# Patient Record
Sex: FEMALE | Race: WHITE | NOT HISPANIC OR LATINO | Employment: FULL TIME | URBAN - METROPOLITAN AREA
[De-identification: names, ages, dates, MRNs, and addresses within clinical notes are randomized per-mention and may not be internally consistent; named-entity substitution may affect disease eponyms.]

---

## 2023-01-30 PROBLEM — R25.1 TREMOR: Status: ACTIVE | Noted: 2023-01-30

## 2023-01-30 PROBLEM — E78.00 HYPERCHOLESTEROLEMIA: Status: ACTIVE | Noted: 2023-01-30

## 2023-01-30 PROBLEM — M17.11 OSTEOARTHRITIS OF RIGHT KNEE: Status: ACTIVE | Noted: 2023-01-30

## 2023-01-30 PROBLEM — S06.0X9A CONCUSSION WITH LOSS OF CONSCIOUSNESS: Status: ACTIVE | Noted: 2023-01-30

## 2023-01-30 PROBLEM — H61.23 BILATERAL IMPACTED CERUMEN: Status: ACTIVE | Noted: 2023-01-30

## 2023-01-30 PROBLEM — J01.90 ACUTE SINUSITIS: Status: ACTIVE | Noted: 2023-01-30

## 2023-01-30 PROBLEM — I10 HYPERTENSION: Status: ACTIVE | Noted: 2023-01-30

## 2023-01-30 PROBLEM — J06.9 URTI (ACUTE UPPER RESPIRATORY INFECTION): Status: ACTIVE | Noted: 2023-01-30

## 2023-01-30 PROBLEM — F98.8 ATTENTION DEFICIT DISORDER WITHOUT HYPERACTIVITY: Status: ACTIVE | Noted: 2023-01-30

## 2023-01-30 PROBLEM — R79.89 ABNORMAL THYROID BLOOD TEST: Status: ACTIVE | Noted: 2023-01-30

## 2023-01-30 PROBLEM — S09.90XA HEAD TRAUMA: Status: ACTIVE | Noted: 2023-01-30

## 2023-01-30 PROBLEM — R05.9 COUGH: Status: ACTIVE | Noted: 2023-01-30

## 2023-01-30 PROBLEM — G44.209 TENSION HEADACHE: Status: ACTIVE | Noted: 2023-01-30

## 2023-01-30 PROBLEM — R87.612 LOW GRADE SQUAMOUS INTRAEPITHELIAL LESION (LGSIL) ON CERVICAL PAP SMEAR: Status: ACTIVE | Noted: 2023-01-30

## 2023-01-30 PROBLEM — J30.9 ALLERGIC RHINITIS: Status: ACTIVE | Noted: 2023-01-30

## 2023-01-30 PROBLEM — E55.9 VITAMIN D DEFICIENCY: Status: ACTIVE | Noted: 2023-01-30

## 2023-01-30 PROBLEM — G47.00 INSOMNIA: Status: ACTIVE | Noted: 2023-01-30

## 2023-01-30 PROBLEM — M25.561 RIGHT KNEE PAIN: Status: ACTIVE | Noted: 2023-01-30

## 2023-01-30 PROBLEM — J45.909 ASTHMA (HHS-HCC): Status: ACTIVE | Noted: 2023-01-30

## 2023-01-30 RX ORDER — LISINOPRIL 20 MG/1
1 TABLET ORAL DAILY
COMMUNITY
Start: 2022-07-15 | End: 2023-11-07 | Stop reason: SDUPTHER

## 2023-01-30 RX ORDER — TRIAMCINOLONE ACETONIDE 1 MG/G
CREAM TOPICAL
COMMUNITY
Start: 2016-12-13 | End: 2023-11-07 | Stop reason: SDUPTHER

## 2023-01-30 RX ORDER — FLUTICASONE PROPIONATE 50 MCG
2 SPRAY, SUSPENSION (ML) NASAL DAILY
COMMUNITY

## 2023-01-30 RX ORDER — CHOLECALCIFEROL (VITAMIN D3) 125 MCG
1 CAPSULE ORAL DAILY
COMMUNITY

## 2023-01-30 RX ORDER — NORGESTIMATE AND ETHINYL ESTRADIOL 0.25-0.035
1 KIT ORAL DAILY
COMMUNITY
Start: 2012-08-16 | End: 2023-03-14 | Stop reason: SDUPTHER

## 2023-03-14 ENCOUNTER — OFFICE VISIT (OUTPATIENT)
Dept: PRIMARY CARE | Facility: CLINIC | Age: 48
End: 2023-03-14
Payer: COMMERCIAL

## 2023-03-14 VITALS
HEART RATE: 80 BPM | SYSTOLIC BLOOD PRESSURE: 116 MMHG | BODY MASS INDEX: 23.54 KG/M2 | DIASTOLIC BLOOD PRESSURE: 78 MMHG | WEIGHT: 137.9 LBS | HEIGHT: 64 IN | OXYGEN SATURATION: 100 %

## 2023-03-14 DIAGNOSIS — Z30.41 ENCOUNTER FOR SURVEILLANCE OF CONTRACEPTIVE PILLS: Primary | ICD-10-CM

## 2023-03-14 DIAGNOSIS — E78.00 HYPERCHOLESTEROLEMIA: ICD-10-CM

## 2023-03-14 DIAGNOSIS — J45.40 MODERATE PERSISTENT ASTHMA WITHOUT COMPLICATION (HHS-HCC): ICD-10-CM

## 2023-03-14 DIAGNOSIS — Z12.11 COLON CANCER SCREENING: ICD-10-CM

## 2023-03-14 DIAGNOSIS — Z00.00 ANNUAL PHYSICAL EXAM: ICD-10-CM

## 2023-03-14 DIAGNOSIS — S06.0X9D CONCUSSION WITH LOSS OF CONSCIOUSNESS, SUBSEQUENT ENCOUNTER: ICD-10-CM

## 2023-03-14 DIAGNOSIS — I10 PRIMARY HYPERTENSION: ICD-10-CM

## 2023-03-14 PROBLEM — R25.1 TREMOR: Status: RESOLVED | Noted: 2023-01-30 | Resolved: 2023-03-14

## 2023-03-14 PROBLEM — H61.23 BILATERAL IMPACTED CERUMEN: Status: RESOLVED | Noted: 2023-01-30 | Resolved: 2023-03-14

## 2023-03-14 PROBLEM — S09.90XA HEAD TRAUMA: Status: RESOLVED | Noted: 2023-01-30 | Resolved: 2023-03-14

## 2023-03-14 PROBLEM — J06.9 URTI (ACUTE UPPER RESPIRATORY INFECTION): Status: RESOLVED | Noted: 2023-01-30 | Resolved: 2023-03-14

## 2023-03-14 PROBLEM — M25.561 RIGHT KNEE PAIN: Status: RESOLVED | Noted: 2023-01-30 | Resolved: 2023-03-14

## 2023-03-14 PROBLEM — R05.9 COUGH: Status: RESOLVED | Noted: 2023-01-30 | Resolved: 2023-03-14

## 2023-03-14 PROBLEM — J01.90 ACUTE SINUSITIS: Status: RESOLVED | Noted: 2023-01-30 | Resolved: 2023-03-14

## 2023-03-14 PROCEDURE — 1036F TOBACCO NON-USER: CPT | Performed by: INTERNAL MEDICINE

## 2023-03-14 PROCEDURE — 3078F DIAST BP <80 MM HG: CPT | Performed by: INTERNAL MEDICINE

## 2023-03-14 PROCEDURE — 99214 OFFICE O/P EST MOD 30 MIN: CPT | Performed by: INTERNAL MEDICINE

## 2023-03-14 PROCEDURE — 3074F SYST BP LT 130 MM HG: CPT | Performed by: INTERNAL MEDICINE

## 2023-03-14 RX ORDER — NORGESTIMATE AND ETHINYL ESTRADIOL 0.25-0.035
1 KIT ORAL DAILY
Qty: 28 TABLET | Refills: 0 | Status: SHIPPED | OUTPATIENT
Start: 2023-03-14

## 2023-03-14 RX ORDER — NAPROXEN 500 MG/1
500 TABLET ORAL
COMMUNITY
Start: 2015-10-20 | End: 2023-07-11 | Stop reason: ALTCHOICE

## 2023-03-14 RX ORDER — CYCLOSPORINE 0.5 MG/ML
1 EMULSION OPHTHALMIC 2 TIMES DAILY
COMMUNITY
Start: 2022-11-14 | End: 2023-07-11 | Stop reason: ALTCHOICE

## 2023-03-14 ASSESSMENT — ENCOUNTER SYMPTOMS
HEADACHES: 0
CHILLS: 0
LIGHT-HEADEDNESS: 0
FATIGUE: 0
DYSURIA: 0
DIFFICULTY URINATING: 0
NAUSEA: 0
FREQUENCY: 0
MYALGIAS: 0
SORE THROAT: 0
DIZZINESS: 0
SHORTNESS OF BREATH: 0
ARTHRALGIAS: 0
VOMITING: 0
PALPITATIONS: 0
WEAKNESS: 0
HEMATURIA: 0
DIARRHEA: 0
CONSTIPATION: 0
FEVER: 0
COUGH: 0

## 2023-03-14 ASSESSMENT — PATIENT HEALTH QUESTIONNAIRE - PHQ9
SUM OF ALL RESPONSES TO PHQ9 QUESTIONS 1 AND 2: 0
1. LITTLE INTEREST OR PLEASURE IN DOING THINGS: NOT AT ALL
2. FEELING DOWN, DEPRESSED OR HOPELESS: NOT AT ALL

## 2023-03-14 ASSESSMENT — PAIN SCALES - GENERAL: PAINLEVEL: 0-NO PAIN

## 2023-03-14 NOTE — ASSESSMENT & PLAN NOTE
Patient is adopted and is unaware of any family history of colon cancer or polyps.  She declined colonoscopy but does agree to complete a Cologuard test and the order will be placed.

## 2023-03-14 NOTE — PATIENT INSTRUCTIONS
Please complete cologuard test for colon cancer screening    Follow up Dr Jewell in 4 months    Get fasting labs in June before next appt.

## 2023-03-14 NOTE — ASSESSMENT & PLAN NOTE
Patient continues to have some issues with memory loss and concentration.  It has improved compared to the baseline and I recommended that she continue to push through because it should continue to slowly improve.  She was offered a neurology referral which she declined.

## 2023-03-14 NOTE — PROGRESS NOTES
Subjective   Patient ID: Rosalva Mayen is a 47 y.o. female who presents for 4 month re for HTN management.  Patient is here for routine follow-up for her medical problems including hypertension, cholesterol and medication management.  She has been having problems getting a hold of her OB/GYN to refill her birth control so I will give her 1 month to get by so she does not run out    Patient is 47 years old and has not had any colon screening yet.  She is adopted and has no known family history.  I did recommend a colonoscopy which she declines but does agree to take a Cologuard test.    Patient did have some head trauma and a concussion a few months back and continues to have issues with memory lapse and trouble concentrating.  She wanted to know if there is anything else she can do about it.  I offered her neurology referral but she declined.        Review of Systems   Constitutional:  Negative for chills, fatigue and fever.        Patient continues to complain of difficulty concentrating and finding her words since her concussion.  The memory lapses seem to be persistent.  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   HENT:  Negative for sore throat.    Eyes:  Negative for visual disturbance.   Respiratory:  Negative for cough and shortness of breath.    Cardiovascular:  Negative for chest pain, palpitations and leg swelling.   Gastrointestinal:  Negative for constipation, diarrhea, nausea and vomiting.   Genitourinary:  Negative for difficulty urinating, dysuria, frequency, hematuria and urgency.   Musculoskeletal:  Negative for arthralgias and myalgias.   Skin:  Negative for rash.   Neurological:  Negative for dizziness, syncope, weakness, light-headedness and headaches.       Objective   Physical Exam  Constitutional:       Appearance: Normal appearance.   HENT:      Head: Normocephalic and atraumatic.      Nose: Nose normal.  "  Eyes:      Extraocular Movements: Extraocular movements intact.      Pupils: Pupils are equal, round, and reactive to light.   Cardiovascular:      Rate and Rhythm: Normal rate and regular rhythm.   Pulmonary:      Breath sounds: Normal breath sounds.   Abdominal:      General: Abdomen is flat. Bowel sounds are normal.      Palpations: Abdomen is soft.   Musculoskeletal:      Right lower leg: No edema.      Left lower leg: No edema.   Neurological:      Mental Status: She is alert.     /78 (BP Location: Left arm, Patient Position: Sitting)   Pulse 80   Ht 1.613 m (5' 3.5\")   Wt 62.6 kg (137 lb 14.4 oz)   SpO2 100%   BMI 24.04 kg/m²       Assessment/Plan   Problem List Items Addressed This Visit          Nervous    Concussion with loss of consciousness     Patient continues to have some issues with memory loss and concentration.  It has improved compared to the baseline and I recommended that she continue to push through because it should continue to slowly improve.  She was offered a neurology referral which she declined.            Respiratory    Asthma     Patient denies any recent asthma exacerbations and is stable.         Relevant Orders    Lipid Panel    CBC    Comprehensive Metabolic Panel    TSH with reflex to Free T4 if abnormal    Vitamin D 1,25 Dihydroxy       Circulatory    Hypertension     Blood pressure is stable.         Relevant Orders    Lipid Panel    CBC    Comprehensive Metabolic Panel    TSH with reflex to Free T4 if abnormal    Vitamin D 1,25 Dihydroxy       Other    Hypercholesterolemia     Cholesterol labs are due in June and she was given a requisition today to have them completed before her next appointment.         Relevant Orders    Lipid Panel    CBC    Comprehensive Metabolic Panel    TSH with reflex to Free T4 if abnormal    Vitamin D 1,25 Dihydroxy    Colon cancer screening     Patient is adopted and is unaware of any family history of colon cancer or polyps.  She " declined colonoscopy but does agree to complete a Cologuard test and the order will be placed.         Relevant Orders    Cologuard® colon cancer screening     Other Visit Diagnoses       Encounter for surveillance of contraceptive pills    -  Primary    Relevant Medications    norgestimate-ethinyl estradioL (Ortho-Cyclen) 0.25-35 mg-mcg tablet    Annual physical exam        Relevant Orders    Lipid Panel    CBC    Comprehensive Metabolic Panel    TSH with reflex to Free T4 if abnormal    Vitamin D 1,25 Dihydroxy          Patient will follow-up with me in 4 months and complete annual blood work prior to that appointment.         It has been a pleasure seeing you.

## 2023-03-14 NOTE — ASSESSMENT & PLAN NOTE
Cholesterol labs are due in June and she was given a requisition today to have them completed before her next appointment.

## 2023-04-26 ENCOUNTER — TELEPHONE (OUTPATIENT)
Dept: PRIMARY CARE | Facility: CLINIC | Age: 48
End: 2023-04-26
Payer: COMMERCIAL

## 2023-04-26 LAB — NONINV COLON CA DNA+OCC BLD SCRN STL QL: POSITIVE

## 2023-04-26 NOTE — TELEPHONE ENCOUNTER
Left message on patient's voicemail to call the office to retrieve message from the Doctor.  BN   ----- Message from Arlette Jewell MD sent at 4/26/2023  5:14 PM EDT -----  Please notify patient her Cologuard test was positive this means she is at high risk for either a colon polyp or possibly even cancer.  She needs to see gastroenterologist and have a colonoscopy.  We usually refer to people at the [facility] with Dr Farrar or associate.  Would she like this referral or would she like to go someplace else?  ----- Message -----  From: [facility] [employer] [facility] - Lab Results In  Sent: 4/26/2023  11:40 AM EDT  To: Arlette Jewell MD

## 2023-04-27 DIAGNOSIS — R19.5 POSITIVE COLORECTAL CANCER SCREENING USING COLOGUARD TEST: Primary | ICD-10-CM

## 2023-04-27 NOTE — RESULT ENCOUNTER NOTE
Referral placed please help patient set up appointment and fax a copy of these results to Dr Farrar.

## 2023-05-10 ENCOUNTER — OFFICE (OUTPATIENT)
Dept: URBAN - METROPOLITAN AREA CLINIC 27 | Facility: CLINIC | Age: 48
End: 2023-05-10
Payer: COMMERCIAL

## 2023-05-10 VITALS
HEIGHT: 64 IN | WEIGHT: 137 LBS | TEMPERATURE: 97.9 F | SYSTOLIC BLOOD PRESSURE: 125 MMHG | DIASTOLIC BLOOD PRESSURE: 82 MMHG | HEART RATE: 80 BPM

## 2023-05-10 DIAGNOSIS — R85.89 OTHER ABNORMAL FINDINGS IN SPECIMENS FROM DIGESTIVE ORGANS A: ICD-10-CM

## 2023-05-10 DIAGNOSIS — K59.09 OTHER CONSTIPATION: ICD-10-CM

## 2023-05-10 PROCEDURE — 99203 OFFICE O/P NEW LOW 30 MIN: CPT | Performed by: INTERNAL MEDICINE

## 2023-06-20 LAB
ALANINE AMINOTRANSFERASE (SGPT) (U/L) IN SER/PLAS: 10 U/L (ref 7–45)
ALBUMIN (G/DL) IN SER/PLAS: 4.4 G/DL (ref 3.4–5)
ALKALINE PHOSPHATASE (U/L) IN SER/PLAS: 60 U/L (ref 33–110)
ANION GAP IN SER/PLAS: 12 MMOL/L (ref 10–20)
ASPARTATE AMINOTRANSFERASE (SGOT) (U/L) IN SER/PLAS: 13 U/L (ref 9–39)
BILIRUBIN TOTAL (MG/DL) IN SER/PLAS: 0.5 MG/DL (ref 0–1.2)
CALCIUM (MG/DL) IN SER/PLAS: 10 MG/DL (ref 8.6–10.6)
CARBON DIOXIDE, TOTAL (MMOL/L) IN SER/PLAS: 29 MMOL/L (ref 21–32)
CHLORIDE (MMOL/L) IN SER/PLAS: 100 MMOL/L (ref 98–107)
CHOLESTEROL (MG/DL) IN SER/PLAS: 259 MG/DL (ref 0–199)
CHOLESTEROL IN HDL (MG/DL) IN SER/PLAS: 91.3 MG/DL
CHOLESTEROL/HDL RATIO: 2.8
CREATININE (MG/DL) IN SER/PLAS: 0.86 MG/DL (ref 0.5–1.05)
ERYTHROCYTE DISTRIBUTION WIDTH (RATIO) BY AUTOMATED COUNT: 12.3 % (ref 11.5–14.5)
ERYTHROCYTE MEAN CORPUSCULAR HEMOGLOBIN CONCENTRATION (G/DL) BY AUTOMATED: 32.5 G/DL (ref 32–36)
ERYTHROCYTE MEAN CORPUSCULAR VOLUME (FL) BY AUTOMATED COUNT: 89 FL (ref 80–100)
ERYTHROCYTES (10*6/UL) IN BLOOD BY AUTOMATED COUNT: 4.83 X10E12/L (ref 4–5.2)
GFR FEMALE: 83 ML/MIN/1.73M2
GLUCOSE (MG/DL) IN SER/PLAS: 84 MG/DL (ref 74–99)
HEMATOCRIT (%) IN BLOOD BY AUTOMATED COUNT: 42.8 % (ref 36–46)
HEMOGLOBIN (G/DL) IN BLOOD: 13.9 G/DL (ref 12–16)
LDL: 142 MG/DL (ref 0–99)
LEUKOCYTES (10*3/UL) IN BLOOD BY AUTOMATED COUNT: 6.8 X10E9/L (ref 4.4–11.3)
NRBC (PER 100 WBCS) BY AUTOMATED COUNT: 0 /100 WBC (ref 0–0)
PLATELETS (10*3/UL) IN BLOOD AUTOMATED COUNT: 433 X10E9/L (ref 150–450)
POTASSIUM (MMOL/L) IN SER/PLAS: 4.6 MMOL/L (ref 3.5–5.3)
PROTEIN TOTAL: 7.6 G/DL (ref 6.4–8.2)
SODIUM (MMOL/L) IN SER/PLAS: 136 MMOL/L (ref 136–145)
THYROTROPIN (MIU/L) IN SER/PLAS BY DETECTION LIMIT <= 0.05 MIU/L: 2.28 MIU/L (ref 0.44–3.98)
TRIGLYCERIDE (MG/DL) IN SER/PLAS: 130 MG/DL (ref 0–149)
UREA NITROGEN (MG/DL) IN SER/PLAS: 13 MG/DL (ref 6–23)
VLDL: 26 MG/DL (ref 0–40)

## 2023-06-23 LAB — VITAMIN D 1,25-DIHYDROXY: 52.3 PG/ML (ref 19.9–79.3)

## 2023-06-27 VITALS
OXYGEN SATURATION: 99 % | SYSTOLIC BLOOD PRESSURE: 119 MMHG | DIASTOLIC BLOOD PRESSURE: 75 MMHG | RESPIRATION RATE: 12 BRPM | OXYGEN SATURATION: 99 % | SYSTOLIC BLOOD PRESSURE: 107 MMHG | WEIGHT: 128 LBS | RESPIRATION RATE: 20 BRPM | DIASTOLIC BLOOD PRESSURE: 74 MMHG | DIASTOLIC BLOOD PRESSURE: 85 MMHG | SYSTOLIC BLOOD PRESSURE: 121 MMHG | RESPIRATION RATE: 12 BRPM | HEART RATE: 96 BPM | SYSTOLIC BLOOD PRESSURE: 112 MMHG | DIASTOLIC BLOOD PRESSURE: 65 MMHG | DIASTOLIC BLOOD PRESSURE: 75 MMHG | HEART RATE: 100 BPM | RESPIRATION RATE: 19 BRPM | SYSTOLIC BLOOD PRESSURE: 122 MMHG | DIASTOLIC BLOOD PRESSURE: 71 MMHG | RESPIRATION RATE: 15 BRPM | SYSTOLIC BLOOD PRESSURE: 126 MMHG | DIASTOLIC BLOOD PRESSURE: 69 MMHG | HEART RATE: 92 BPM | RESPIRATION RATE: 19 BRPM | DIASTOLIC BLOOD PRESSURE: 77 MMHG | RESPIRATION RATE: 15 BRPM | OXYGEN SATURATION: 100 % | SYSTOLIC BLOOD PRESSURE: 112 MMHG | DIASTOLIC BLOOD PRESSURE: 74 MMHG | SYSTOLIC BLOOD PRESSURE: 115 MMHG | DIASTOLIC BLOOD PRESSURE: 67 MMHG | RESPIRATION RATE: 16 BRPM | SYSTOLIC BLOOD PRESSURE: 107 MMHG | DIASTOLIC BLOOD PRESSURE: 67 MMHG | HEART RATE: 114 BPM | SYSTOLIC BLOOD PRESSURE: 111 MMHG | RESPIRATION RATE: 19 BRPM | HEART RATE: 100 BPM | DIASTOLIC BLOOD PRESSURE: 67 MMHG | TEMPERATURE: 98.6 F | SYSTOLIC BLOOD PRESSURE: 107 MMHG | HEART RATE: 83 BPM | HEART RATE: 100 BPM | DIASTOLIC BLOOD PRESSURE: 77 MMHG | HEART RATE: 105 BPM | HEART RATE: 96 BPM | HEART RATE: 83 BPM | RESPIRATION RATE: 9 BRPM | HEART RATE: 83 BPM | OXYGEN SATURATION: 90 % | SYSTOLIC BLOOD PRESSURE: 119 MMHG | SYSTOLIC BLOOD PRESSURE: 112 MMHG | TEMPERATURE: 98.6 F | HEART RATE: 105 BPM | HEART RATE: 94 BPM | DIASTOLIC BLOOD PRESSURE: 74 MMHG | DIASTOLIC BLOOD PRESSURE: 85 MMHG | DIASTOLIC BLOOD PRESSURE: 65 MMHG | SYSTOLIC BLOOD PRESSURE: 115 MMHG | DIASTOLIC BLOOD PRESSURE: 81 MMHG | SYSTOLIC BLOOD PRESSURE: 115 MMHG | HEART RATE: 94 BPM | RESPIRATION RATE: 15 BRPM | OXYGEN SATURATION: 100 % | WEIGHT: 128 LBS | OXYGEN SATURATION: 100 % | HEART RATE: 92 BPM | RESPIRATION RATE: 14 BRPM | RESPIRATION RATE: 11 BRPM | HEART RATE: 114 BPM | RESPIRATION RATE: 11 BRPM | DIASTOLIC BLOOD PRESSURE: 71 MMHG | SYSTOLIC BLOOD PRESSURE: 121 MMHG | SYSTOLIC BLOOD PRESSURE: 95 MMHG | RESPIRATION RATE: 9 BRPM | OXYGEN SATURATION: 90 % | DIASTOLIC BLOOD PRESSURE: 55 MMHG | RESPIRATION RATE: 9 BRPM | HEART RATE: 94 BPM | RESPIRATION RATE: 13 BRPM | RESPIRATION RATE: 12 BRPM | DIASTOLIC BLOOD PRESSURE: 71 MMHG | RESPIRATION RATE: 20 BRPM | HEART RATE: 99 BPM | TEMPERATURE: 98.6 F | OXYGEN SATURATION: 99 % | SYSTOLIC BLOOD PRESSURE: 95 MMHG | DIASTOLIC BLOOD PRESSURE: 77 MMHG | DIASTOLIC BLOOD PRESSURE: 81 MMHG | HEIGHT: 64 IN | DIASTOLIC BLOOD PRESSURE: 65 MMHG | RESPIRATION RATE: 14 BRPM | DIASTOLIC BLOOD PRESSURE: 75 MMHG | RESPIRATION RATE: 16 BRPM | HEART RATE: 114 BPM | SYSTOLIC BLOOD PRESSURE: 111 MMHG | SYSTOLIC BLOOD PRESSURE: 122 MMHG | HEART RATE: 96 BPM | SYSTOLIC BLOOD PRESSURE: 122 MMHG | SYSTOLIC BLOOD PRESSURE: 121 MMHG | SYSTOLIC BLOOD PRESSURE: 95 MMHG | DIASTOLIC BLOOD PRESSURE: 85 MMHG | RESPIRATION RATE: 16 BRPM | RESPIRATION RATE: 13 BRPM | SYSTOLIC BLOOD PRESSURE: 126 MMHG | HEART RATE: 105 BPM | WEIGHT: 128 LBS | DIASTOLIC BLOOD PRESSURE: 55 MMHG | DIASTOLIC BLOOD PRESSURE: 69 MMHG | RESPIRATION RATE: 20 BRPM | RESPIRATION RATE: 11 BRPM | DIASTOLIC BLOOD PRESSURE: 55 MMHG | DIASTOLIC BLOOD PRESSURE: 81 MMHG | SYSTOLIC BLOOD PRESSURE: 119 MMHG | RESPIRATION RATE: 13 BRPM | DIASTOLIC BLOOD PRESSURE: 69 MMHG | SYSTOLIC BLOOD PRESSURE: 126 MMHG | SYSTOLIC BLOOD PRESSURE: 111 MMHG | HEIGHT: 64 IN | HEART RATE: 99 BPM | HEART RATE: 92 BPM | HEART RATE: 99 BPM | RESPIRATION RATE: 14 BRPM | OXYGEN SATURATION: 90 % | HEIGHT: 64 IN

## 2023-06-29 ENCOUNTER — AMBULATORY SURGICAL CENTER (OUTPATIENT)
Dept: URBAN - METROPOLITAN AREA SURGERY 12 | Facility: SURGERY | Age: 48
End: 2023-06-29

## 2023-06-29 ENCOUNTER — AMBULATORY SURGICAL CENTER (OUTPATIENT)
Dept: URBAN - METROPOLITAN AREA SURGERY 12 | Facility: SURGERY | Age: 48
End: 2023-06-29
Payer: COMMERCIAL

## 2023-06-29 ENCOUNTER — TELEPHONE (OUTPATIENT)
Dept: PRIMARY CARE | Facility: CLINIC | Age: 48
End: 2023-06-29
Payer: COMMERCIAL

## 2023-06-29 DIAGNOSIS — K59.09 OTHER CONSTIPATION: ICD-10-CM

## 2023-06-29 DIAGNOSIS — R19.5 OTHER FECAL ABNORMALITIES: ICD-10-CM

## 2023-06-29 PROBLEM — K57.30 DIVERTICULOSIS OF LARGE INTESTINE WITHOUT PERFORATION OR ABS: Status: ACTIVE | Noted: 2023-06-29

## 2023-06-29 PROCEDURE — 45378 DIAGNOSTIC COLONOSCOPY: CPT | Performed by: INTERNAL MEDICINE

## 2023-06-29 NOTE — TELEPHONE ENCOUNTER
Patient notified and read message. Patient also state to let you know she had her colonoscopy done.  BN      ----- Message from Arlette Jewell MD sent at 6/27/2023  8:45 PM EDT -----  Please notify patient that all of her labs were normal except for her cholesterol.  Her LDL or bad cholesterol should be less than 100 and it was 142.  Please work on a low-fat diet and I will see her at her appointment in July.

## 2023-07-11 ENCOUNTER — OFFICE VISIT (OUTPATIENT)
Dept: PRIMARY CARE | Facility: CLINIC | Age: 48
End: 2023-07-11
Payer: COMMERCIAL

## 2023-07-11 VITALS
SYSTOLIC BLOOD PRESSURE: 113 MMHG | DIASTOLIC BLOOD PRESSURE: 77 MMHG | OXYGEN SATURATION: 100 % | HEIGHT: 64 IN | BODY MASS INDEX: 23.73 KG/M2 | HEART RATE: 72 BPM | WEIGHT: 139 LBS

## 2023-07-11 DIAGNOSIS — E78.00 HYPERCHOLESTEROLEMIA: ICD-10-CM

## 2023-07-11 DIAGNOSIS — I10 PRIMARY HYPERTENSION: ICD-10-CM

## 2023-07-11 DIAGNOSIS — Z12.31 SCREENING MAMMOGRAM, ENCOUNTER FOR: Primary | ICD-10-CM

## 2023-07-11 DIAGNOSIS — J45.20 MILD INTERMITTENT ASTHMA, UNSPECIFIED WHETHER COMPLICATED (HHS-HCC): ICD-10-CM

## 2023-07-11 PROBLEM — R79.89 ABNORMAL THYROID BLOOD TEST: Status: RESOLVED | Noted: 2023-01-30 | Resolved: 2023-07-11

## 2023-07-11 PROCEDURE — 1036F TOBACCO NON-USER: CPT | Performed by: INTERNAL MEDICINE

## 2023-07-11 PROCEDURE — 3078F DIAST BP <80 MM HG: CPT | Performed by: INTERNAL MEDICINE

## 2023-07-11 PROCEDURE — 3074F SYST BP LT 130 MM HG: CPT | Performed by: INTERNAL MEDICINE

## 2023-07-11 PROCEDURE — 99214 OFFICE O/P EST MOD 30 MIN: CPT | Performed by: INTERNAL MEDICINE

## 2023-07-11 RX ORDER — IBUPROFEN 200 MG
200 TABLET ORAL EVERY 6 HOURS PRN
COMMUNITY

## 2023-07-11 ASSESSMENT — ENCOUNTER SYMPTOMS
LIGHT-HEADEDNESS: 0
FREQUENCY: 0
DYSURIA: 0
HEADACHES: 0
COUGH: 0
MYALGIAS: 0
HEMATURIA: 0
CHILLS: 0
FATIGUE: 0
PALPITATIONS: 0
WEAKNESS: 0
CONSTIPATION: 0
DIZZINESS: 0
SORE THROAT: 0
NAUSEA: 0
FEVER: 0
ARTHRALGIAS: 0
DIARRHEA: 0
DIFFICULTY URINATING: 0
SHORTNESS OF BREATH: 0
VOMITING: 0

## 2023-07-11 ASSESSMENT — PATIENT HEALTH QUESTIONNAIRE - PHQ9
1. LITTLE INTEREST OR PLEASURE IN DOING THINGS: NOT AT ALL
SUM OF ALL RESPONSES TO PHQ9 QUESTIONS 1 AND 2: 0
2. FEELING DOWN, DEPRESSED OR HOPELESS: NOT AT ALL

## 2023-07-11 ASSESSMENT — PAIN SCALES - GENERAL: PAINLEVEL: 2

## 2023-07-11 NOTE — PATIENT INSTRUCTIONS
Please get mammo    Work on low fat diet    Follow up Dr Jewell in 4 months  for HTN      Ways to Help Prevent Falls at Home    Quick Tips   ? Ask for help if you need it. Most people want to help!   ? Get up slowly after sitting or laying down   ? Wear a medical alert device or keep cell phone in your pocket   ? Use night lights, especially areas near a bathroom   ? Keep the items you use often within reach on a small stool or end table   ? Use an assistive device such as walker or cane, as directed by provider/physical therapy   ? Use a non-slip mat and grab bars in your bathroom. Look for home health sections for best options     Other Areas to Focus On   ? Exercise and nutrition: Regular exercise or taking a falls prevention class are great ways improve strength and balance. Don’t forget to stay hydrated and bring a snack!   ? Medicine side effects: Some medicines can make you sleepy or dizzy, which could cause a fall. Ask your healthcare provider about the side effects your medicines could cause. Be sure to let them know if you take any vitamins or supplements as well.   ? Tripping hazards: Remove items you could trip on, such as loose mats, rugs, cords, and clutter. Wear closed toe shoes with rubber soles.   ? Health and wellness: Get regular checkups with your healthcare provider, plus routine vision and hearing screenings. Talk with your healthcare provider about:   o Your medicines and the possible side effects - bring them in a bag if that is easier!   o Problems with balance or feeling dizzy   o Ways to promote bone health, such as Vitamin D and calcium supplements   o Questions or concerns about falling     *Ask your healthcare team if you have questions     ©[facility], 2022

## 2023-07-11 NOTE — PROGRESS NOTES
Subjective   Patient ID: Rosalva Mayen is a 47 y.o. female who presents for 4 month follow-up for HTN management.  BN    Patient is here today for routine 4-month follow-up for hypertension.  She is on birth control as well.  Blood pressure is stable without difficulties at this time.  Patient recently had a steroid injection in her right knee and is now able to run again and is feeling much better.        Review of Systems   Constitutional:  Negative for chills, fatigue and fever.   HENT:  Negative for sore throat.    Eyes:  Negative for visual disturbance.   Respiratory:  Negative for cough and shortness of breath.    Cardiovascular:  Negative for chest pain, palpitations and leg swelling.   Gastrointestinal:  Negative for constipation, diarrhea, nausea and vomiting.   Genitourinary:  Negative for difficulty urinating, dysuria, frequency, hematuria and urgency.   Musculoskeletal:  Negative for arthralgias and myalgias.   Skin:  Negative for rash.   Neurological:  Negative for dizziness, syncope, weakness, light-headedness and headaches.       Objective   Medication Documentation Review Audit       Reviewed by Arlette Jewell MD (Physician) on 03/14/23 at 0933      Medication Order Taking? Sig Documenting Provider Last Dose Status   cholecalciferol (Vitamin D-3) 125 MCG (5000 UT) capsule [id number] Yes Take 1 capsule (125 mcg) by mouth once daily. Historical Provider, MD Taking Active   fluticasone (Flonase) 50 mcg/actuation nasal spray [id number] Yes Administer 2 sprays into each nostril once daily. Historical Provider, MD Taking Active   lisinopril 20 mg tablet [id number] Yes Take 1 tablet (20 mg) by mouth once daily. Historical Provider, MD Taking Active   naproxen (Naprosyn) 500 mg tablet [id number] No Take 1 tablet (500 mg) by mouth. Historical Provider, MD Not Taking Active   norgestimate-ethinyl estradioL (Ortho-Cyclen) 0.25-35 mg-mcg tablet [id number]  Take 1 tablet by mouth once daily. As directed Arlette Jewell MD   "Active   Restasis 0.05 % ophthalmic emulsion [id number] Yes Administer 1 drop into both eyes in the morning and 1 drop before bedtime. Historical Provider, MD Taking Active   triamcinolone (Kenalog) 0.1 % cream [id number] Yes APPLY A THIN LAYER TO AFFECTED AREA(S) TWICE DAILY. Historical Provider, MD Taking Active                  Physical Exam  Constitutional:       Appearance: Normal appearance.   HENT:      Head: Normocephalic and atraumatic.      Nose: Nose normal.   Eyes:      Extraocular Movements: Extraocular movements intact.      Pupils: Pupils are equal, round, and reactive to light.   Cardiovascular:      Rate and Rhythm: Normal rate and regular rhythm.   Pulmonary:      Breath sounds: Normal breath sounds.   Abdominal:      General: Abdomen is flat. Bowel sounds are normal.      Palpations: Abdomen is soft.   Musculoskeletal:      Right lower leg: No edema.      Left lower leg: No edema.   Neurological:      Mental Status: She is alert.     /77 (BP Location: Left arm, Patient Position: Sitting)   Pulse 72   Ht 1.613 m (5' 3.5\")   Wt 63 kg (139 lb)   SpO2 100%   BMI 24.24 kg/m²       Assessment/Plan   Problem List Items Addressed This Visit       Asthma     Patient does have asthma however she has had no recent exacerbations and appears to be stable and well-controlled         Hypercholesterolemia     Patient recently got labs and LDL cholesterol is again elevated.  We discussed a low-fat diet and she will continue to work on this.  Recently she had a steroid injection in her knee and she is able to exercise again so she is hoping that will help as well.         Hypertension     Pressure is stable and well-controlled so no changes will be made and she will stay on lisinopril 20 mg daily         Screening mammogram, encounter for - Primary    Relevant Orders    BI mammo bilateral screening tomosynthesis       Labs completed and reviewed with the patient today.  Follow-up with me will be in 4 months " for hypertension         It has been a pleasure seeing you.

## 2023-10-12 ENCOUNTER — HOSPITAL ENCOUNTER (OUTPATIENT)
Dept: RADIOLOGY | Facility: CLINIC | Age: 48
Discharge: HOME | End: 2023-10-12
Payer: COMMERCIAL

## 2023-10-12 DIAGNOSIS — Z12.31 ENCOUNTER FOR SCREENING MAMMOGRAM FOR MALIGNANT NEOPLASM OF BREAST: ICD-10-CM

## 2023-10-12 PROCEDURE — 77067 SCR MAMMO BI INCL CAD: CPT | Mod: 50

## 2023-10-12 PROCEDURE — 77063 BREAST TOMOSYNTHESIS BI: CPT | Mod: BILATERAL PROCEDURE | Performed by: RADIOLOGY

## 2023-10-12 PROCEDURE — 77067 SCR MAMMO BI INCL CAD: CPT | Mod: BILATERAL PROCEDURE | Performed by: RADIOLOGY

## 2023-11-07 ENCOUNTER — OFFICE VISIT (OUTPATIENT)
Dept: ORTHOPEDIC SURGERY | Facility: CLINIC | Age: 48
End: 2023-11-07
Payer: COMMERCIAL

## 2023-11-07 ENCOUNTER — OFFICE VISIT (OUTPATIENT)
Dept: PRIMARY CARE | Facility: CLINIC | Age: 48
End: 2023-11-07
Payer: COMMERCIAL

## 2023-11-07 VITALS
OXYGEN SATURATION: 100 % | WEIGHT: 138.8 LBS | DIASTOLIC BLOOD PRESSURE: 78 MMHG | HEART RATE: 81 BPM | BODY MASS INDEX: 23.7 KG/M2 | SYSTOLIC BLOOD PRESSURE: 114 MMHG | HEIGHT: 64 IN

## 2023-11-07 VITALS — BODY MASS INDEX: 23.71 KG/M2 | WEIGHT: 138.89 LBS | HEIGHT: 64 IN

## 2023-11-07 DIAGNOSIS — E78.00 HYPERCHOLESTEROLEMIA: ICD-10-CM

## 2023-11-07 DIAGNOSIS — M17.11 PRIMARY OSTEOARTHRITIS OF RIGHT KNEE: Primary | ICD-10-CM

## 2023-11-07 DIAGNOSIS — I10 PRIMARY HYPERTENSION: Primary | ICD-10-CM

## 2023-11-07 DIAGNOSIS — L20.9 ATOPIC DERMATITIS, UNSPECIFIED TYPE: ICD-10-CM

## 2023-11-07 PROCEDURE — 1036F TOBACCO NON-USER: CPT | Performed by: INTERNAL MEDICINE

## 2023-11-07 PROCEDURE — 3074F SYST BP LT 130 MM HG: CPT | Performed by: ORTHOPAEDIC SURGERY

## 2023-11-07 PROCEDURE — 3074F SYST BP LT 130 MM HG: CPT | Performed by: INTERNAL MEDICINE

## 2023-11-07 PROCEDURE — 99213 OFFICE O/P EST LOW 20 MIN: CPT | Performed by: ORTHOPAEDIC SURGERY

## 2023-11-07 PROCEDURE — 3078F DIAST BP <80 MM HG: CPT | Performed by: INTERNAL MEDICINE

## 2023-11-07 PROCEDURE — 1036F TOBACCO NON-USER: CPT | Performed by: ORTHOPAEDIC SURGERY

## 2023-11-07 PROCEDURE — 20610 DRAIN/INJ JOINT/BURSA W/O US: CPT | Performed by: ORTHOPAEDIC SURGERY

## 2023-11-07 PROCEDURE — 3078F DIAST BP <80 MM HG: CPT | Performed by: ORTHOPAEDIC SURGERY

## 2023-11-07 PROCEDURE — 99213 OFFICE O/P EST LOW 20 MIN: CPT | Performed by: INTERNAL MEDICINE

## 2023-11-07 RX ORDER — LISINOPRIL 20 MG/1
20 TABLET ORAL DAILY
Qty: 30 TABLET | Refills: 11 | Status: SHIPPED | OUTPATIENT
Start: 2023-11-07 | End: 2024-03-07 | Stop reason: SDUPTHER

## 2023-11-07 RX ORDER — TRIAMCINOLONE ACETONIDE 40 MG/ML
2.5 INJECTION, SUSPENSION INTRA-ARTICULAR; INTRAMUSCULAR
Status: COMPLETED | OUTPATIENT
Start: 2023-11-07 | End: 2023-11-07

## 2023-11-07 RX ORDER — TRIAMCINOLONE ACETONIDE 1 MG/G
CREAM TOPICAL 2 TIMES DAILY
Qty: 45 G | Refills: 1 | Status: SHIPPED | OUTPATIENT
Start: 2023-11-07

## 2023-11-07 RX ORDER — METHYLPREDNISOLONE ACETATE 40 MG/ML
40 INJECTION, SUSPENSION INTRA-ARTICULAR; INTRALESIONAL; INTRAMUSCULAR; SOFT TISSUE
Status: COMPLETED | OUTPATIENT
Start: 2023-11-07 | End: 2023-11-07

## 2023-11-07 RX ORDER — LIDOCAINE HYDROCHLORIDE 10 MG/ML
4 INJECTION, SOLUTION EPIDURAL; INFILTRATION; INTRACAUDAL; PERINEURAL
Status: COMPLETED | OUTPATIENT
Start: 2023-11-07 | End: 2023-11-07

## 2023-11-07 RX ADMIN — LIDOCAINE HYDROCHLORIDE 4 ML: 10 INJECTION, SOLUTION EPIDURAL; INFILTRATION; INTRACAUDAL; PERINEURAL at 09:25

## 2023-11-07 RX ADMIN — TRIAMCINOLONE ACETONIDE 2.5 MG: 40 INJECTION, SUSPENSION INTRA-ARTICULAR; INTRAMUSCULAR at 09:25

## 2023-11-07 RX ADMIN — METHYLPREDNISOLONE ACETATE 40 MG: 40 INJECTION, SUSPENSION INTRA-ARTICULAR; INTRALESIONAL; INTRAMUSCULAR; SOFT TISSUE at 09:25

## 2023-11-07 ASSESSMENT — ENCOUNTER SYMPTOMS
VOMITING: 0
NAUSEA: 0
COUGH: 0
HEADACHES: 0
SHORTNESS OF BREATH: 0
MYALGIAS: 0
FEVER: 0
DIFFICULTY URINATING: 0
SORE THROAT: 0
PALPITATIONS: 0
HEMATURIA: 0
FATIGUE: 0
FREQUENCY: 0
CHILLS: 0
DIARRHEA: 0
LIGHT-HEADEDNESS: 0
WEAKNESS: 0
DYSURIA: 0
ARTHRALGIAS: 0
DIZZINESS: 0
CONSTIPATION: 0

## 2023-11-07 ASSESSMENT — PAIN SCALES - GENERAL: PAINLEVEL: 4

## 2023-11-07 NOTE — PROGRESS NOTES
Subjective   Patient ID: Rosalva Mayen is a 48 y.o. female who presents for 4 month follow up for cholesterol and HTN management.  BN    Patient is here for routine 4-month follow-up for her hypertension, high cholesterol and arthritis in her knees.  She feels well overall and has no new complaints.          Review of Systems   Constitutional:  Negative for chills, fatigue and fever.   HENT:  Negative for sore throat.    Eyes:  Negative for visual disturbance.   Respiratory:  Negative for cough and shortness of breath.    Cardiovascular:  Negative for chest pain, palpitations and leg swelling.   Gastrointestinal:  Negative for constipation, diarrhea, nausea and vomiting.   Genitourinary:  Negative for difficulty urinating, dysuria, frequency, hematuria and urgency.   Musculoskeletal:  Negative for arthralgias and myalgias.   Skin:  Negative for rash.   Neurological:  Negative for dizziness, syncope, weakness, light-headedness and headaches.       Objective   Medication Documentation Review Audit       Reviewed by Nicolas Lewis MA (Medical Assistant) on 11/07/23 at 0859      Medication Order Taking? Sig Documenting Provider Last Dose Status   cholecalciferol (Vitamin D-3) 125 MCG (5000 UT) capsule [id number] No Take 1 capsule (125 mcg) by mouth once daily. Historical Provider, MD Taking Active   fluticasone (Flonase) 50 mcg/actuation nasal spray [id number] No Administer 2 sprays into each nostril once daily. Historical Provider, MD Taking Active   ibuprofen 200 mg tablet [id number]  Take 1 tablet (200 mg) by mouth every 6 hours if needed for moderate pain (4 - 6). Historical Provider, MD  Active   Discontinued 11/07/23 0836   lisinopril 20 mg tablet [id number]  Take 1 tablet (20 mg) by mouth once daily. Arlette Jewell MD  Active   norgestimate-ethinyl estradioL (Ortho-Cyclen) 0.25-35 mg-mcg tablet [id number]  Take 1 tablet by mouth once daily. As directed Arlette Jewell MD  Active   Discontinued 11/07/23 0836  "  triamcinolone (Kenalog) 0.1 % cream [id number]  Apply topically 2 times a day. APPLY A THIN LAYER TO AFFECTED AREA(S) TWICE DAILY. Arlette Jewell MD  Active                  Allergies   Allergen Reactions    Nut - Unspecified Anaphylaxis    Tree Nuts Anaphylaxis    Clarithromycin Nausea Only    Codeine Unknown    Erythromycin Nausea Only and Other     Vomiting      Fruit Flavor Angioedema     Mangos      Other Unknown    Winston Hives    White Petrolatum-Mineral Oil Itching and Swelling    Latex Rash    Nickel Rash    Penicillins Rash    Pollen Extracts Rash     Cats, molds,dust mites, trees, grasses, weeds, ragweed     Physical Exam  Constitutional:       Appearance: Normal appearance.   HENT:      Head: Normocephalic and atraumatic.      Nose: Nose normal.   Eyes:      Extraocular Movements: Extraocular movements intact.      Pupils: Pupils are equal, round, and reactive to light.   Cardiovascular:      Rate and Rhythm: Normal rate and regular rhythm.   Pulmonary:      Breath sounds: Normal breath sounds.   Abdominal:      General: Abdomen is flat. Bowel sounds are normal.      Palpations: Abdomen is soft.   Musculoskeletal:      Right lower leg: No edema.      Left lower leg: No edema.   Neurological:      Mental Status: She is alert.     /78 (BP Location: Left arm, Patient Position: Sitting)   Pulse 81   Ht 1.613 m (5' 3.5\")   Wt 63 kg (138 lb 12.8 oz)   SpO2 100%   BMI 24.20 kg/m²       Assessment/Plan   Problem List Items Addressed This Visit       Hypercholesterolemia     Patient has hypercholesterolemia which is diet controlled.  Annual blood work is due in June         Hypertension - Primary     Blood pressure stable and well-controlled         Relevant Medications    lisinopril 20 mg tablet     Other Visit Diagnoses       Atopic dermatitis, unspecified type        Relevant Medications    triamcinolone (Kenalog) 0.1 % cream                   It has been a pleasure seeing you.  Arlette Jewell MD "

## 2023-11-07 NOTE — PROGRESS NOTES
Patient returns for an injection past medical history is unchanged she has known osteoarthritis of the right knee.    Their limb is warm, and well-perfused.  They have intact sensation to light touch in all lower extremity dermatomes.  Their quadriceps and hamstring strength is 5 of 5.  Their skin is intact.  Their limb is stable from a ligament standpoint  There is a trace effusion.  There is one out of 3 patellofemoral crepitus    Range of motion is preserved    Patient ID: Rosalva Mayen is a 48 y.o. female.    L Inj/Asp: R knee on 11/7/2023 9:25 AM  Indications: pain  Details: 22 G needle, anterior approach  Medications: 2.5 mg triamcinolone acetonide 40 mg/mL; 40 mg methylPREDNISolone acetate 40 mg/mL; 4 mL lidocaine PF 10 mg/mL (1 %)    Under sterile conditions the right knee was injected with 4cc of lidocaine 40mg DepoMedrol.  The patient tolerated the procedure well.  There were no apparent complications.  Sterile bandage was applied.  Procedure, treatment alternatives, risks and benefits explained, specific risks discussed. Consent was given by the patient. Immediately prior to procedure a time out was called to verify the correct patient, procedure, equipment, support staff and site/side marked as required. Patient was prepped and draped in the usual sterile fashion.       Successful injection for osteoarthritis of the knee.  Follow-up as needed.

## 2023-11-20 ENCOUNTER — TELEPHONE (OUTPATIENT)
Dept: PRIMARY CARE | Facility: CLINIC | Age: 48
End: 2023-11-20
Payer: COMMERCIAL

## 2023-11-20 NOTE — TELEPHONE ENCOUNTER
Patient is having an allergic reaction on arm from possible bandaid,tape. Will not go away. Asking to be seen can I put her in this afternoon at 15 min. Opening.  [phone]

## 2024-03-07 ENCOUNTER — OFFICE VISIT (OUTPATIENT)
Dept: PRIMARY CARE | Facility: CLINIC | Age: 49
End: 2024-03-07
Payer: COMMERCIAL

## 2024-03-07 VITALS
BODY MASS INDEX: 23.87 KG/M2 | HEART RATE: 76 BPM | SYSTOLIC BLOOD PRESSURE: 112 MMHG | OXYGEN SATURATION: 99 % | WEIGHT: 139.8 LBS | HEIGHT: 64 IN | DIASTOLIC BLOOD PRESSURE: 64 MMHG

## 2024-03-07 DIAGNOSIS — Z00.00 ANNUAL PHYSICAL EXAM: Primary | ICD-10-CM

## 2024-03-07 DIAGNOSIS — J45.20 MILD INTERMITTENT ASTHMA, UNSPECIFIED WHETHER COMPLICATED (HHS-HCC): ICD-10-CM

## 2024-03-07 DIAGNOSIS — E78.00 HYPERCHOLESTEROLEMIA: ICD-10-CM

## 2024-03-07 DIAGNOSIS — I10 PRIMARY HYPERTENSION: ICD-10-CM

## 2024-03-07 PROBLEM — S06.0X9A CONCUSSION WITH LOSS OF CONSCIOUSNESS: Status: RESOLVED | Noted: 2023-01-30 | Resolved: 2024-03-07

## 2024-03-07 PROCEDURE — 3074F SYST BP LT 130 MM HG: CPT | Performed by: INTERNAL MEDICINE

## 2024-03-07 PROCEDURE — 99214 OFFICE O/P EST MOD 30 MIN: CPT | Performed by: INTERNAL MEDICINE

## 2024-03-07 PROCEDURE — 3078F DIAST BP <80 MM HG: CPT | Performed by: INTERNAL MEDICINE

## 2024-03-07 PROCEDURE — 1036F TOBACCO NON-USER: CPT | Performed by: INTERNAL MEDICINE

## 2024-03-07 RX ORDER — LISINOPRIL 20 MG/1
20 TABLET ORAL DAILY
Qty: 30 TABLET | Refills: 11 | Status: SHIPPED | OUTPATIENT
Start: 2024-03-07

## 2024-03-07 ASSESSMENT — ENCOUNTER SYMPTOMS
FATIGUE: 0
HEADACHES: 0
ARTHRALGIAS: 0
NAUSEA: 0
DIARRHEA: 0
SORE THROAT: 0
HEMATURIA: 0
DYSURIA: 0
LIGHT-HEADEDNESS: 0
CHILLS: 0
VOMITING: 0
WEAKNESS: 0
FEVER: 0
DIFFICULTY URINATING: 0
DIZZINESS: 0
CONSTIPATION: 0
COUGH: 0
SHORTNESS OF BREATH: 0
PALPITATIONS: 0
MYALGIAS: 0
FREQUENCY: 0

## 2024-03-07 ASSESSMENT — PATIENT HEALTH QUESTIONNAIRE - PHQ9
2. FEELING DOWN, DEPRESSED OR HOPELESS: NOT AT ALL
1. LITTLE INTEREST OR PLEASURE IN DOING THINGS: NOT AT ALL
SUM OF ALL RESPONSES TO PHQ9 QUESTIONS 1 AND 2: 0

## 2024-03-07 ASSESSMENT — PAIN SCALES - GENERAL: PAINLEVEL: 6

## 2024-03-07 NOTE — ASSESSMENT & PLAN NOTE
Patient manages her cholesterol with diet and exercise.  She is due for annual blood work in June was given a requisition now to complete before her next appointment.

## 2024-03-07 NOTE — PATIENT INSTRUCTIONS
Follow up Dr Jewell in 4 month for HTN etc 30 min appt    Please get fasting labs before next appointment

## 2024-03-07 NOTE — PROGRESS NOTES
Subjective   Patient ID: Rosavla Mayen is a 48 y.o. female who presents for 4 month for HTN management.  BN    Patient is here for routine follow-up on her hypertension asthma and medication management.  She feels well and has no new complaints.  She has not used her rescue inhaler in at least the last 2 months and says it is more frequent to use it with exercise or more in the summer months.  Overall she feels well.        Review of Systems   Constitutional:  Negative for chills, fatigue and fever.   HENT:  Negative for sore throat.    Eyes:  Negative for visual disturbance.   Respiratory:  Negative for cough and shortness of breath.    Cardiovascular:  Negative for chest pain, palpitations and leg swelling.   Gastrointestinal:  Negative for constipation, diarrhea, nausea and vomiting.   Genitourinary:  Negative for difficulty urinating, dysuria, frequency, hematuria and urgency.   Musculoskeletal:  Negative for arthralgias and myalgias.   Skin:  Negative for rash.   Neurological:  Negative for dizziness, syncope, weakness, light-headedness and headaches.       Objective   Medication Documentation Review Audit       Reviewed by Arlette Jewell MD (Physician) on 03/07/24 at 0844      Medication Order Taking? Sig Documenting Provider Last Dose Status   cholecalciferol (Vitamin D-3) 125 MCG (5000 UT) capsule [id number] No Take 1 capsule (125 mcg) by mouth once daily. Historical Provider, MD Taking Active   fluticasone (Flonase) 50 mcg/actuation nasal spray [id number] No Administer 2 sprays into each nostril once daily. Historical Provider, MD Taking Active   ibuprofen 200 mg tablet [id number]  Take 1 tablet (200 mg) by mouth every 6 hours if needed for moderate pain (4 - 6). Historical Provider, MD  Active   lisinopril 20 mg tablet [id number]  Take 1 tablet (20 mg) by mouth once daily. Arlette Jewell MD  Active   norgestimate-ethinyl estradioL (Ortho-Cyclen) 0.25-35 mg-mcg tablet [id number]  Take 1 tablet by mouth once daily.  "As directed Arlette Jewell MD  Active   triamcinolone (Kenalog) 0.1 % cream [id number]  Apply topically 2 times a day. APPLY A THIN LAYER TO AFFECTED AREA(S) TWICE DAILY. Arlette Jewell MD  Active                  Allergies   Allergen Reactions    Nut - Unspecified Anaphylaxis    Tree Nuts Anaphylaxis    Clarithromycin Nausea Only    Codeine Unknown    Erythromycin Nausea Only and Other     Vomiting      Fruit Flavor Angioedema     Mangos      Other Unknown    Cassia Hives    White Petrolatum-Mineral Oil Itching and Swelling    Bacitracin Rash    Latex Rash    Nickel Rash    Penicillins Rash    Pollen Extracts Rash     Cats, molds,dust mites, trees, grasses, weeds, ragweed     Physical Exam  Constitutional:       Appearance: Normal appearance.   HENT:      Head: Normocephalic and atraumatic.      Nose: Nose normal.   Eyes:      Extraocular Movements: Extraocular movements intact.      Pupils: Pupils are equal, round, and reactive to light.   Cardiovascular:      Rate and Rhythm: Normal rate and regular rhythm.   Pulmonary:      Breath sounds: Normal breath sounds.   Abdominal:      General: Abdomen is flat. Bowel sounds are normal.      Palpations: Abdomen is soft.   Musculoskeletal:      Right lower leg: No edema.      Left lower leg: No edema.   Neurological:      Mental Status: She is alert.     /64 (BP Location: Left arm, Patient Position: Sitting)   Pulse 76   Ht 1.613 m (5' 3.5\")   Wt 63.4 kg (139 lb 12.8 oz)   SpO2 99%   BMI 24.38 kg/m²       Assessment/Plan   Problem List Items Addressed This Visit       Asthma     Asthma is doing well this time of year and has no complaints and has not used a rescue inhaler in at least 2 months.         Relevant Orders    Lipid Panel    CBC    Comprehensive Metabolic Panel    TSH with reflex to Free T4 if abnormal    Vitamin D 25-Hydroxy,Total (for eval of Vitamin D levels)    Hypercholesterolemia     Patient manages her cholesterol with diet and exercise.  She is " due for annual blood work in June was given a requisition now to complete before her next appointment.         Hypertension     Hypertension is stable and well-controlled.  Patient was given a refill on her lisinopril         Relevant Medications    lisinopril 20 mg tablet    Other Relevant Orders    Lipid Panel    CBC    Comprehensive Metabolic Panel    TSH with reflex to Free T4 if abnormal    Vitamin D 25-Hydroxy,Total (for eval of Vitamin D levels)     Other Visit Diagnoses       Annual physical exam    -  Primary    Relevant Orders    Lipid Panel    CBC    Comprehensive Metabolic Panel    TSH with reflex to Free T4 if abnormal    Vitamin D 25-Hydroxy,Total (for eval of Vitamin D levels)                   It has been a pleasure seeing you.  Arlette Jewell MD

## 2024-03-07 NOTE — ASSESSMENT & PLAN NOTE
Asthma is doing well this time of year and has no complaints and has not used a rescue inhaler in at least 2 months.

## 2024-03-14 ENCOUNTER — TELEPHONE (OUTPATIENT)
Dept: PRIMARY CARE | Facility: CLINIC | Age: 49
End: 2024-03-14
Payer: COMMERCIAL

## 2024-03-14 DIAGNOSIS — S61.219A LACERATION OF FINGER, FOREIGN BODY PRESENCE UNSPECIFIED, NAIL DAMAGE STATUS UNSPECIFIED, UNSPECIFIED FINGER, UNSPECIFIED LATERALITY, INITIAL ENCOUNTER: Primary | ICD-10-CM

## 2024-03-14 RX ORDER — CLINDAMYCIN PHOSPHATE 10 MG/G
GEL TOPICAL 2 TIMES DAILY
Qty: 60 G | Refills: 5 | Status: SHIPPED | OUTPATIENT
Start: 2024-03-14 | End: 2025-03-14

## 2024-03-14 NOTE — TELEPHONE ENCOUNTER
Pt calling in with cut on her finger. She reports that she is allergic to neosporin and bacitracin and was told to call you if she has an injury so that you can call something in for her. Please advise

## 2024-03-19 ENCOUNTER — HOSPITAL ENCOUNTER (OUTPATIENT)
Dept: RADIOLOGY | Facility: CLINIC | Age: 49
Discharge: HOME | End: 2024-03-19
Payer: COMMERCIAL

## 2024-03-19 ENCOUNTER — OFFICE VISIT (OUTPATIENT)
Dept: ORTHOPEDIC SURGERY | Facility: CLINIC | Age: 49
End: 2024-03-19
Payer: COMMERCIAL

## 2024-03-19 DIAGNOSIS — M25.561 RIGHT KNEE PAIN, UNSPECIFIED CHRONICITY: ICD-10-CM

## 2024-03-19 PROCEDURE — 1036F TOBACCO NON-USER: CPT | Performed by: ORTHOPAEDIC SURGERY

## 2024-03-19 PROCEDURE — 73564 X-RAY EXAM KNEE 4 OR MORE: CPT | Mod: RT

## 2024-03-19 PROCEDURE — 99213 OFFICE O/P EST LOW 20 MIN: CPT | Performed by: ORTHOPAEDIC SURGERY

## 2024-03-19 PROCEDURE — 20610 DRAIN/INJ JOINT/BURSA W/O US: CPT | Performed by: ORTHOPAEDIC SURGERY

## 2024-03-19 PROCEDURE — 73564 X-RAY EXAM KNEE 4 OR MORE: CPT | Mod: RIGHT SIDE | Performed by: RADIOLOGY

## 2024-03-19 RX ORDER — LIDOCAINE HYDROCHLORIDE 10 MG/ML
4 INJECTION INFILTRATION; PERINEURAL
Status: COMPLETED | OUTPATIENT
Start: 2024-03-19 | End: 2024-03-19

## 2024-03-19 RX ORDER — METHYLPREDNISOLONE ACETATE 40 MG/ML
40 INJECTION, SUSPENSION INTRA-ARTICULAR; INTRALESIONAL; INTRAMUSCULAR; SOFT TISSUE
Status: COMPLETED | OUTPATIENT
Start: 2024-03-19 | End: 2024-03-19

## 2024-03-19 RX ADMIN — METHYLPREDNISOLONE ACETATE 40 MG: 40 INJECTION, SUSPENSION INTRA-ARTICULAR; INTRALESIONAL; INTRAMUSCULAR; SOFT TISSUE at 08:24

## 2024-03-19 RX ADMIN — LIDOCAINE HYDROCHLORIDE 4 ML: 10 INJECTION INFILTRATION; PERINEURAL at 08:24

## 2024-03-19 NOTE — PROGRESS NOTES
Patient returns to see me today for her right knee she has known osteoarthritis she is requesting an injection her past medical history is unchanged    Their limb is warm, and well-perfused.  They have intact sensation to light touch in all lower extremity dermatomes.  Their quadriceps and hamstring strength is 5 of 5.  Their skin is intact.  Their limb is stable from a ligament standpoint  There is a trace effusion.  There is one out of 3 patellofemoral crepitus    Range of motion is preserved    Successful injection for osteoarthritis in the knee.  Follow-up as needed.Patient ID: Rosalva Mayen is a 48 y.o. female.    L Inj/Asp: R knee on 3/19/2024 8:24 AM  Indications: pain  Details: 22 G needle, anterior approach  Medications: 40 mg methylPREDNISolone acetate 40 mg/mL; 4 mL lidocaine 10 mg/mL (1 %)    Under sterile conditions the right knee was injected with 4cc of lidocaine 40mg DepoMedrol.  The patient tolerated the procedure well.  There were no apparent complications.  Sterile bandage was applied.  Procedure, treatment alternatives, risks and benefits explained, specific risks discussed. Consent was given by the patient. Immediately prior to procedure a time out was called to verify the correct patient, procedure, equipment, support staff and site/side marked as required. Patient was prepped and draped in the usual sterile fashion.

## 2024-07-02 ENCOUNTER — LAB (OUTPATIENT)
Dept: LAB | Facility: LAB | Age: 49
End: 2024-07-02
Payer: COMMERCIAL

## 2024-07-02 DIAGNOSIS — J45.20 MILD INTERMITTENT ASTHMA, UNSPECIFIED WHETHER COMPLICATED (HHS-HCC): ICD-10-CM

## 2024-07-02 DIAGNOSIS — Z00.00 ANNUAL PHYSICAL EXAM: ICD-10-CM

## 2024-07-02 DIAGNOSIS — I10 PRIMARY HYPERTENSION: ICD-10-CM

## 2024-07-02 LAB
25(OH)D3 SERPL-MCNC: 79 NG/ML (ref 30–100)
ALBUMIN SERPL BCP-MCNC: 4.2 G/DL (ref 3.4–5)
ALP SERPL-CCNC: 63 U/L (ref 33–110)
ALT SERPL W P-5'-P-CCNC: 10 U/L (ref 7–45)
ANION GAP SERPL CALC-SCNC: 13 MMOL/L (ref 10–20)
AST SERPL W P-5'-P-CCNC: 14 U/L (ref 9–39)
BILIRUB SERPL-MCNC: 0.5 MG/DL (ref 0–1.2)
BUN SERPL-MCNC: 13 MG/DL (ref 6–23)
CALCIUM SERPL-MCNC: 9.5 MG/DL (ref 8.6–10.6)
CHLORIDE SERPL-SCNC: 99 MMOL/L (ref 98–107)
CHOLEST SERPL-MCNC: 207 MG/DL (ref 0–199)
CHOLESTEROL/HDL RATIO: 3.1
CO2 SERPL-SCNC: 25 MMOL/L (ref 21–32)
CREAT SERPL-MCNC: 1.03 MG/DL (ref 0.5–1.05)
EGFRCR SERPLBLD CKD-EPI 2021: 67 ML/MIN/1.73M*2
ERYTHROCYTE [DISTWIDTH] IN BLOOD BY AUTOMATED COUNT: 12.8 % (ref 11.5–14.5)
GLUCOSE SERPL-MCNC: 96 MG/DL (ref 74–99)
HCT VFR BLD AUTO: 40.1 % (ref 36–46)
HDLC SERPL-MCNC: 67.5 MG/DL
HGB BLD-MCNC: 13.1 G/DL (ref 12–16)
LDLC SERPL CALC-MCNC: 126 MG/DL
MCH RBC QN AUTO: 28.3 PG (ref 26–34)
MCHC RBC AUTO-ENTMCNC: 32.7 G/DL (ref 32–36)
MCV RBC AUTO: 87 FL (ref 80–100)
NON HDL CHOLESTEROL: 140 MG/DL (ref 0–149)
NRBC BLD-RTO: 0 /100 WBCS (ref 0–0)
PLATELET # BLD AUTO: 413 X10*3/UL (ref 150–450)
POTASSIUM SERPL-SCNC: 4.6 MMOL/L (ref 3.5–5.3)
PROT SERPL-MCNC: 7.2 G/DL (ref 6.4–8.2)
RBC # BLD AUTO: 4.63 X10*6/UL (ref 4–5.2)
SODIUM SERPL-SCNC: 132 MMOL/L (ref 136–145)
TRIGL SERPL-MCNC: 70 MG/DL (ref 0–149)
TSH SERPL-ACNC: 3.58 MIU/L (ref 0.44–3.98)
VLDL: 14 MG/DL (ref 0–40)
WBC # BLD AUTO: 7.5 X10*3/UL (ref 4.4–11.3)

## 2024-07-02 PROCEDURE — 84443 ASSAY THYROID STIM HORMONE: CPT

## 2024-07-02 PROCEDURE — 85027 COMPLETE CBC AUTOMATED: CPT

## 2024-07-02 PROCEDURE — 80053 COMPREHEN METABOLIC PANEL: CPT

## 2024-07-02 PROCEDURE — 80061 LIPID PANEL: CPT

## 2024-07-02 PROCEDURE — 82306 VITAMIN D 25 HYDROXY: CPT

## 2024-07-02 PROCEDURE — 36415 COLL VENOUS BLD VENIPUNCTURE: CPT

## 2024-07-11 ENCOUNTER — APPOINTMENT (OUTPATIENT)
Dept: PRIMARY CARE | Facility: CLINIC | Age: 49
End: 2024-07-11
Payer: COMMERCIAL

## 2024-07-11 VITALS
OXYGEN SATURATION: 99 % | DIASTOLIC BLOOD PRESSURE: 76 MMHG | WEIGHT: 145.8 LBS | BODY MASS INDEX: 24.89 KG/M2 | HEIGHT: 64 IN | SYSTOLIC BLOOD PRESSURE: 110 MMHG | HEART RATE: 84 BPM

## 2024-07-11 DIAGNOSIS — N92.0 MENORRHAGIA WITH REGULAR CYCLE: ICD-10-CM

## 2024-07-11 DIAGNOSIS — I10 PRIMARY HYPERTENSION: Primary | ICD-10-CM

## 2024-07-11 PROCEDURE — 3078F DIAST BP <80 MM HG: CPT | Performed by: INTERNAL MEDICINE

## 2024-07-11 PROCEDURE — 3074F SYST BP LT 130 MM HG: CPT | Performed by: INTERNAL MEDICINE

## 2024-07-11 PROCEDURE — 1036F TOBACCO NON-USER: CPT | Performed by: INTERNAL MEDICINE

## 2024-07-11 PROCEDURE — 99213 OFFICE O/P EST LOW 20 MIN: CPT | Performed by: INTERNAL MEDICINE

## 2024-07-11 ASSESSMENT — ENCOUNTER SYMPTOMS
FEVER: 0
COUGH: 0
ABDOMINAL PAIN: 0
ARTHRALGIAS: 0
FREQUENCY: 0
DIZZINESS: 0
DYSURIA: 0
NAUSEA: 0
LIGHT-HEADEDNESS: 0
SHORTNESS OF BREATH: 0
VOMITING: 0
SORE THROAT: 0
PALPITATIONS: 0
TROUBLE SWALLOWING: 0
FATIGUE: 0
CONSTIPATION: 0
DIARRHEA: 0

## 2024-07-11 ASSESSMENT — PATIENT HEALTH QUESTIONNAIRE - PHQ9
2. FEELING DOWN, DEPRESSED OR HOPELESS: NOT AT ALL
SUM OF ALL RESPONSES TO PHQ9 QUESTIONS 1 AND 2: 0
1. LITTLE INTEREST OR PLEASURE IN DOING THINGS: NOT AT ALL

## 2024-07-11 ASSESSMENT — PAIN SCALES - GENERAL: PAINLEVEL: 4

## 2024-07-11 NOTE — ASSESSMENT & PLAN NOTE
Blood pressure is now stable and controlled but I would like to see the patient off of estrogen replacement.  I have asked her to speak to her OB/GYN about a possible ablation to see whether or not she is a candidate and if it would be appropriate so we could get her off of estrogen altogether.

## 2024-07-11 NOTE — ASSESSMENT & PLAN NOTE
Patient has been on birth control for years because of heavy menstrual cycles and bleeding.  She has very severe cramping to the point where she is disabled and not able to work.  I would like to just have her discuss this with her OB/GYN to see if she is appropriate for or consider an ablation.  I would like to see her off the hormones because of her advancing age.  Patient will discuss this with her GYN Dr. Cervantes

## 2024-07-11 NOTE — PATIENT INSTRUCTIONS
Follow up Dr Jewell in 4 months for HTN etc        Please  talk to GYN about possible ablation for heavy menses.  I would like her off all hormone replacement due to age and HTN  - Dr MARSHALL

## 2024-07-11 NOTE — PROGRESS NOTES
Subjective   Patient ID: Rosalva Mayen is a 48 y.o. female who presents for 4 month re for HTN, cholesterol, and asthma management.    Patient is here for 4-month follow-up on hypertension cholesterol and medication management.  Patient just completed blood work and we did review it in person today.  Her LDL cholesterol is better now down to 126 and she will continue to work on diet and exercise    Patient brings in several blood pressure readings from home and overall her blood pressure is doing much better as well.  Patient is still working with her OB/GYN to get her menstrual cycle under control.  We tried to encourage her to get off estrogen replacement because of her advancing age but every time she does she has worsening menstrual cycles or cramps.  I have told the patient that she might do well to have an ablation to stop the bleeding and cramping and get her off hormones so that her blood pressure can remain more stable        Review of Systems   Constitutional:  Negative for fatigue and fever.   HENT:  Negative for sore throat and trouble swallowing.    Eyes:  Negative for visual disturbance.   Respiratory:  Negative for cough and shortness of breath.    Cardiovascular:  Negative for chest pain, palpitations and leg swelling.   Gastrointestinal:  Negative for abdominal pain, constipation, diarrhea, nausea and vomiting.   Genitourinary:  Negative for dysuria and frequency.   Musculoskeletal:  Negative for arthralgias.   Skin:  Negative for rash.   Neurological:  Negative for dizziness and light-headedness.       Objective   Medication Documentation Review Audit       Reviewed by Arlette Jewell MD (Physician) on 07/11/24 at 0843      Medication Order Taking? Sig Documenting Provider Last Dose Status   cholecalciferol (Vitamin D-3) 125 MCG (5000 UT) capsule [id number]  Take 1 capsule (125 mcg) by mouth once daily. Historical Provider, MD  Active   clindamycin (Cleocin T) 1 % gel [id number]  Apply topically 2  times a day. apply to affected area Arlette Jewell MD  Active   fluticasone (Flonase) 50 mcg/actuation nasal spray [id number]  Administer 2 sprays into each nostril once daily. Historical Provider, MD  Active   ibuprofen 200 mg tablet [id number]  Take 1 tablet (200 mg) by mouth every 6 hours if needed for moderate pain (4 - 6). Historical Provider, MD  Active   lisinopril 20 mg tablet [id number]  Take 1 tablet (20 mg) by mouth once daily. Arlette Jewell MD  Active   norgestimate-ethinyl estradioL (Ortho-Cyclen) 0.25-35 mg-mcg tablet [id number]  Take 1 tablet by mouth once daily. As directed Arlette Jewell MD  Active   triamcinolone (Kenalog) 0.1 % cream [id number]  Apply topically 2 times a day. APPLY A THIN LAYER TO AFFECTED AREA(S) TWICE DAILY. Arlette Jewell MD  Active                  Allergies   Allergen Reactions    Nut - Unspecified Anaphylaxis    Tree Nuts Anaphylaxis    Clarithromycin Nausea Only    Codeine Unknown    Erythromycin Nausea Only and Other     Vomiting      Fruit Flavor Angioedema     [place]      Other Unknown    [place] Hives    White Petrolatum-Mineral Oil Itching and Swelling    Bacitracin Rash    Latex Rash    Nickel Rash    Penicillins Rash    Pollen Extracts Rash     Cats, molds,dust mites, trees, grasses, weeds, ragweed     Physical Exam  Constitutional:       Appearance: Normal appearance.   HENT:      Head: Normocephalic and atraumatic.      Nose: Nose normal.   Eyes:      Extraocular Movements: Extraocular movements intact.      Pupils: Pupils are equal, round, and reactive to light.   Cardiovascular:      Rate and Rhythm: Normal rate and regular rhythm.   Pulmonary:      Breath sounds: Normal breath sounds.   Abdominal:      General: Abdomen is flat. Bowel sounds are normal.      Palpations: Abdomen is soft.   Musculoskeletal:      Right lower leg: No edema.      Left lower leg: No edema.   Neurological:      Mental Status: She is alert.     /76 (BP Location: Left arm, Patient  "Position: Sitting)   Pulse 84   Ht 1.613 m (5' 3.5\")   Wt 66.1 kg (145 lb 12.8 oz)   SpO2 99%   BMI 25.42 kg/m²       Assessment/Plan   Problem List Items Addressed This Visit       Hypertension - Primary     Blood pressure is now stable and controlled but I would like to see the patient off of estrogen replacement.  I have asked her to speak to her OB/GYN about a possible ablation to see whether or not she is a candidate and if it would be appropriate so we could get her off of estrogen altogether.             Menorrhagia with regular cycle     Patient has been on birth control for years because of heavy menstrual cycles and bleeding.  She has very severe cramping to the point where she is disabled and not able to work.  I would like to just have her discuss this with her OB/GYN to see if she is appropriate for or consider an ablation.  I would like to see her off the hormones because of her advancing age.  Patient will discuss this with her GYN Dr. Cervantes                   It has been a pleasure seeing you.  Arlette Jewell MD    "

## 2024-10-15 ENCOUNTER — HOSPITAL ENCOUNTER (OUTPATIENT)
Dept: RADIOLOGY | Facility: CLINIC | Age: 49
Discharge: HOME | End: 2024-10-15
Payer: COMMERCIAL

## 2024-10-15 VITALS — WEIGHT: 145.72 LBS | HEIGHT: 64 IN | BODY MASS INDEX: 24.88 KG/M2

## 2024-10-15 DIAGNOSIS — Z12.31 ENCOUNTER FOR SCREENING MAMMOGRAM FOR MALIGNANT NEOPLASM OF BREAST: ICD-10-CM

## 2024-10-15 PROCEDURE — 77063 BREAST TOMOSYNTHESIS BI: CPT | Performed by: RADIOLOGY

## 2024-10-15 PROCEDURE — 77067 SCR MAMMO BI INCL CAD: CPT | Performed by: RADIOLOGY

## 2024-10-15 PROCEDURE — 77067 SCR MAMMO BI INCL CAD: CPT

## 2024-11-12 ENCOUNTER — APPOINTMENT (OUTPATIENT)
Dept: PRIMARY CARE | Facility: CLINIC | Age: 49
End: 2024-11-12
Payer: COMMERCIAL

## 2024-11-12 VITALS
HEART RATE: 75 BPM | OXYGEN SATURATION: 100 % | DIASTOLIC BLOOD PRESSURE: 83 MMHG | SYSTOLIC BLOOD PRESSURE: 116 MMHG | HEIGHT: 64 IN | BODY MASS INDEX: 23.93 KG/M2 | WEIGHT: 140.2 LBS

## 2024-11-12 DIAGNOSIS — I10 PRIMARY HYPERTENSION: Primary | ICD-10-CM

## 2024-11-12 DIAGNOSIS — R11.0 NAUSEA: ICD-10-CM

## 2024-11-12 DIAGNOSIS — E78.00 HYPERCHOLESTEROLEMIA: ICD-10-CM

## 2024-11-12 DIAGNOSIS — J45.20 MILD INTERMITTENT ASTHMA, UNSPECIFIED WHETHER COMPLICATED (HHS-HCC): ICD-10-CM

## 2024-11-12 PROCEDURE — 3074F SYST BP LT 130 MM HG: CPT | Performed by: INTERNAL MEDICINE

## 2024-11-12 PROCEDURE — 3008F BODY MASS INDEX DOCD: CPT | Performed by: INTERNAL MEDICINE

## 2024-11-12 PROCEDURE — 1036F TOBACCO NON-USER: CPT | Performed by: INTERNAL MEDICINE

## 2024-11-12 PROCEDURE — 3079F DIAST BP 80-89 MM HG: CPT | Performed by: INTERNAL MEDICINE

## 2024-11-12 PROCEDURE — 99214 OFFICE O/P EST MOD 30 MIN: CPT | Performed by: INTERNAL MEDICINE

## 2024-11-12 ASSESSMENT — ENCOUNTER SYMPTOMS
FATIGUE: 0
COUGH: 0
FREQUENCY: 0
SORE THROAT: 0
VOMITING: 0
DIARRHEA: 0
FEVER: 0
NAUSEA: 1
DIZZINESS: 0
DYSURIA: 0
ARTHRALGIAS: 0
LIGHT-HEADEDNESS: 0
TROUBLE SWALLOWING: 0
CONSTIPATION: 0
SHORTNESS OF BREATH: 0
ABDOMINAL PAIN: 0
PALPITATIONS: 0

## 2024-11-12 NOTE — ASSESSMENT & PLAN NOTE
Patient's blood pressure stable and well-controlled.  She did not need any refills today.  She remains on lisinopril 20 mg daily

## 2024-11-12 NOTE — PROGRESS NOTES
Subjective   Patient ID: Rosalva Mayen is a 49 y.o. female who presents for No chief complaint on file..  Patient is here today for routine 4-month follow-up on hypertension asthma cholesterol and medication management.  Patient notes that she has nausea every day all the time the last few months.  She cannot find a precipitating event.  She has no vomiting and has not affected her eating at all but she has constant nausea and even has it in the office today.  She does not feel congested or stuffy but admits she has not been taking an antihistamine    She thought perhaps the nausea was related to menopause or perimenopausal symptoms    Patient has lost 5 pounds since October but she admits she has been riding her bike a lot more as well        Review of Systems   Constitutional:  Negative for fatigue and fever.   HENT:  Negative for sore throat and trouble swallowing.    Eyes:  Negative for visual disturbance.   Respiratory:  Negative for cough and shortness of breath.    Cardiovascular:  Negative for chest pain, palpitations and leg swelling.   Gastrointestinal:  Positive for nausea. Negative for abdominal pain, constipation, diarrhea and vomiting.   Genitourinary:  Negative for dysuria and frequency.   Musculoskeletal:  Negative for arthralgias.   Skin:  Negative for rash.   Neurological:  Negative for dizziness and light-headedness.       Objective   Medication Documentation Review Audit       Reviewed by Arlette Jewell MD (Physician) on 11/12/24 at 0840      Medication Order Taking? Sig Documenting Provider Last Dose Status   cholecalciferol (Vitamin D-3) 125 MCG (5000 UT) capsule [id number] No Take 1 capsule (125 mcg) by mouth once daily. Historical Provider, MD Taking Active   clindamycin (Cleocin T) 1 % gel [id number]  Apply topically 2 times a day. apply to affected area Arlette Jewell MD  Active   fluticasone (Flonase) 50 mcg/actuation nasal spray [id number] No Administer 2 sprays into each nostril once daily.  "Historical Provider, MD Taking Active   ibuprofen 200 mg tablet [id number]  Take 1 tablet (200 mg) by mouth every 6 hours if needed for moderate pain (4 - 6). Historical Provider, MD  Active   lisinopril 20 mg tablet [id number]  Take 1 tablet (20 mg) by mouth once daily. Arlette Jewell MD  Active   norethindrone-e.estradioL-iron (Loestrin 24 FE) 1 mg-20 mcg (24)/75 mg (4) tablet [id number]  Take 1 tablet by mouth once daily. Historical Provider, MD  Active   triamcinolone (Kenalog) 0.1 % cream [id number]  Apply topically 2 times a day. APPLY A THIN LAYER TO AFFECTED AREA(S) TWICE DAILY. Arlette Jewell MD  Active                  Allergies   Allergen Reactions    Nut - Unspecified Anaphylaxis    Tree Nuts Anaphylaxis    Clarithromycin Nausea Only    Codeine Unknown    Erythromycin Nausea Only and Other     Vomiting      Fruit Flavor Angioedema     [place]      Other Unknown    [place] Hives    White Petrolatum-Mineral Oil Itching and Swelling    Bacitracin Rash    Latex Rash    Nickel Rash    Penicillins Rash    Pollen Extracts Rash     Cats, molds,dust mites, trees, grasses, weeds, ragweed       /83 (BP Location: Left arm, Patient Position: Sitting, BP Cuff Size: Adult)   Pulse 75   Ht 1.613 m (5' 3.5\")   Wt 63.6 kg (140 lb 3.2 oz)   SpO2 100%   BMI 24.45 kg/m²     Physical Exam  Constitutional:       Appearance: Normal appearance.   HENT:      Head: Normocephalic and atraumatic.      Nose: Nose normal.   Eyes:      Extraocular Movements: Extraocular movements intact.      Pupils: Pupils are equal, round, and reactive to light.   Cardiovascular:      Rate and Rhythm: Normal rate and regular rhythm.   Pulmonary:      Breath sounds: Normal breath sounds.   Abdominal:      General: Abdomen is flat. Bowel sounds are normal.      Palpations: Abdomen is soft.   Musculoskeletal:      Right lower leg: No edema.      Left lower leg: No edema.   Neurological:      Mental Status: She is alert.           Assessment/Plan "   Problem List Items Addressed This Visit       Asthma     Patient was recently complaining of shortness of breath while carrying a [place] tree up steps but she is able to ride her bike extended periods without shortness of breath.  I suspect it was just a different type of muscle usage going up the stairs that triggered the shortness of breath.  Despite this she did not use her rescue inhaler and has not needed 1 in several months.         Hypercholesterolemia     Annual blood work was completed in July.  Patient continues to work on diet and exercise and is riding her bike regularly         Hypertension - Primary     Patient's blood pressure stable and well-controlled.  She did not need any refills today.  She remains on lisinopril 20 mg daily         Nausea     Patient reports of nausea for the last few months but cannot find a precipitating event.  At this time I have asked her to take Zyrtec every night at bedtime for a week to see if the nausea improves.  The only time she does not have nausea is when she is drinking water or just after she eats but then it slowly recurs.  It does not affect her lifestyle, it is not affecting her appetite or food but is definitely present persistently and daily    If the Zyrtec does not help she is to let me know and I will check an amylase and lipase.                   It has been a pleasure seeing you.  Arlette Jewell MD

## 2024-11-12 NOTE — ASSESSMENT & PLAN NOTE
Patient reports of nausea for the last few months but cannot find a precipitating event.  At this time I have asked her to take Zyrtec every night at bedtime for a week to see if the nausea improves.  The only time she does not have nausea is when she is drinking water or just after she eats but then it slowly recurs.  It does not affect her lifestyle, it is not affecting her appetite or food but is definitely present persistently and daily    If the Zyrtec does not help she is to let me know and I will check an amylase and lipase.

## 2024-11-12 NOTE — ASSESSMENT & PLAN NOTE
Patient was recently complaining of shortness of breath while carrying a [employer] tree up steps but she is able to ride her bike extended periods without shortness of breath.  I suspect it was just a different type of muscle usage going up the stairs that triggered the shortness of breath.  Despite this she did not use her rescue inhaler and has not needed 1 in several months.

## 2024-11-12 NOTE — ASSESSMENT & PLAN NOTE
Annual blood work was completed in July.  Patient continues to work on diet and exercise and is riding her bike regularly

## 2024-11-20 DIAGNOSIS — I10 PRIMARY HYPERTENSION: ICD-10-CM

## 2024-11-20 RX ORDER — LISINOPRIL 20 MG/1
20 TABLET ORAL DAILY
Qty: 30 TABLET | Refills: 0 | Status: SHIPPED | OUTPATIENT
Start: 2024-11-20

## 2024-12-27 DIAGNOSIS — I10 PRIMARY HYPERTENSION: ICD-10-CM

## 2024-12-27 RX ORDER — LISINOPRIL 20 MG/1
20 TABLET ORAL DAILY
Qty: 30 TABLET | Refills: 11 | Status: SHIPPED | OUTPATIENT
Start: 2024-12-27

## 2024-12-27 NOTE — TELEPHONE ENCOUNTER
Refill      lisinopril 20 mg tablet  20 mg, Daily           Summary: Take 1 tablet (20 mg) by mouth   once daily.,        [facility]

## 2025-02-14 ENCOUNTER — TELEPHONE (OUTPATIENT)
Dept: PRIMARY CARE | Facility: CLINIC | Age: 50
End: 2025-02-14

## 2025-02-14 ENCOUNTER — OFFICE VISIT (OUTPATIENT)
Dept: PRIMARY CARE | Facility: CLINIC | Age: 50
End: 2025-02-14
Payer: COMMERCIAL

## 2025-02-14 VITALS
WEIGHT: 138.6 LBS | OXYGEN SATURATION: 99 % | HEIGHT: 64 IN | BODY MASS INDEX: 23.66 KG/M2 | TEMPERATURE: 98.1 F | HEART RATE: 77 BPM | SYSTOLIC BLOOD PRESSURE: 109 MMHG | DIASTOLIC BLOOD PRESSURE: 74 MMHG

## 2025-02-14 DIAGNOSIS — J06.9 URTI (ACUTE UPPER RESPIRATORY INFECTION): Primary | ICD-10-CM

## 2025-02-14 PROCEDURE — 3008F BODY MASS INDEX DOCD: CPT | Performed by: INTERNAL MEDICINE

## 2025-02-14 PROCEDURE — 99213 OFFICE O/P EST LOW 20 MIN: CPT | Performed by: INTERNAL MEDICINE

## 2025-02-14 PROCEDURE — 3074F SYST BP LT 130 MM HG: CPT | Performed by: INTERNAL MEDICINE

## 2025-02-14 PROCEDURE — 1036F TOBACCO NON-USER: CPT | Performed by: INTERNAL MEDICINE

## 2025-02-14 PROCEDURE — 3078F DIAST BP <80 MM HG: CPT | Performed by: INTERNAL MEDICINE

## 2025-02-14 RX ORDER — CEPHALEXIN 500 MG/1
500 CAPSULE ORAL 2 TIMES DAILY
Qty: 14 CAPSULE | Refills: 0 | Status: SHIPPED | OUTPATIENT
Start: 2025-02-14 | End: 2025-02-21

## 2025-02-14 ASSESSMENT — PAIN SCALES - GENERAL: PAINLEVEL_OUTOF10: 4

## 2025-02-14 ASSESSMENT — PATIENT HEALTH QUESTIONNAIRE - PHQ9
1. LITTLE INTEREST OR PLEASURE IN DOING THINGS: NOT AT ALL
2. FEELING DOWN, DEPRESSED OR HOPELESS: NOT AT ALL
SUM OF ALL RESPONSES TO PHQ9 QUESTIONS 1 AND 2: 0

## 2025-02-14 ASSESSMENT — ENCOUNTER SYMPTOMS
COUGH: 1
ABDOMINAL PAIN: 0
SINUS PRESSURE: 1
PALPITATIONS: 0
RHINORRHEA: 1
CONSTIPATION: 0
FREQUENCY: 0
DIZZINESS: 0
FEVER: 0
NAUSEA: 0
DYSURIA: 0
DIARRHEA: 0
SORE THROAT: 0
CHILLS: 0
LIGHT-HEADEDNESS: 0
ARTHRALGIAS: 0
VOMITING: 0
TROUBLE SWALLOWING: 0
SINUS PAIN: 0
FATIGUE: 0
SHORTNESS OF BREATH: 0

## 2025-02-14 NOTE — ASSESSMENT & PLAN NOTE
This may be an early sinusitis.  As her symptopms have worsened this week I will give her cephalexin 500mg BID X7 days.    Patient is to call back next week if she is not better.  She was warned that this may be viral , in which case it just needs to run its course.

## 2025-02-14 NOTE — TELEPHONE ENCOUNTER
Patient want to be seen today    Symptoms start Sunday or Monday    Negative covid test on Wednesday.    Symptoms:  Sore throat, dry cough, runny nose, headaches body aches  Patient states chest feel like someone is sitting on it. She said it feels tight and heavy    Taken coricedin - not helping    Please advise

## 2025-02-14 NOTE — PROGRESS NOTES
Subjective   Patient ID: Rosalva Mayen is a 49 y.o. female who presents for No chief complaint on file..  Patient is here for URTI symptoms.  It began last Sunday with a runny nose, and later a sore throat.  Symptoms have worsened as the week has progressed.    She also has some nausea but no fever or chills. She has a dry cough for the ast 2 to 3 days as well.    COVID test at home was neg.        Review of Systems   Constitutional:  Negative for chills, fatigue and fever.   HENT:  Positive for congestion, ear pain, postnasal drip, rhinorrhea and sinus pressure. Negative for nosebleeds, sinus pain, sore throat and trouble swallowing.    Eyes:  Negative for visual disturbance.   Respiratory:  Positive for cough. Negative for shortness of breath.    Cardiovascular:  Negative for chest pain, palpitations and leg swelling.   Gastrointestinal:  Negative for abdominal pain, constipation, diarrhea, nausea and vomiting.   Genitourinary:  Negative for dysuria and frequency.   Musculoskeletal:  Negative for arthralgias.   Skin:  Negative for rash.   Neurological:  Negative for dizziness and light-headedness.       Objective   Medication Documentation Review Audit       Reviewed by Arlette Jewell MD (Physician) on 02/14/25 at 1022      Medication Order Taking? Sig Documenting Provider Last Dose Status   cholecalciferol (Vitamin D-3) 125 MCG (5000 UT) capsule [id number] No Take 1 capsule (125 mcg) by mouth once daily. Historical Provider, MD Taking Active   clindamycin (Cleocin T) 1 % gel [id number]  Apply topically 2 times a day. apply to affected area Arlette Jewell MD  Active   fluticasone (Flonase) 50 mcg/actuation nasal spray [id number] No Administer 2 sprays into each nostril once daily. Historical Provider, MD Taking Active   ibuprofen 200 mg tablet [id number]  Take 1 tablet (200 mg) by mouth every 6 hours if needed for moderate pain (4 - 6). Historical Provider, MD  Active   lisinopril 20 mg tablet [id number]  Take 1 tablet  "(20 mg) by mouth once daily. Arlette Jewell MD  Active   norethindrone-e.estradioL-iron (Loestrin 24 FE) 1 mg-20 mcg (24)/75 mg (4) tablet [id number]  Take 1 tablet by mouth once daily. Historical Provider, MD  Active   triamcinolone (Kenalog) 0.1 % cream [id number]  Apply topically 2 times a day. APPLY A THIN LAYER TO AFFECTED AREA(S) TWICE DAILY. Arlette Jewell MD  Active                  Allergies   Allergen Reactions    Nut - Unspecified Anaphylaxis    Tree Nuts Anaphylaxis    Clarithromycin Nausea Only    Codeine Unknown    Erythromycin Nausea Only and Other     Vomiting      Fruit Flavor Angioedema     [place]      Other Unknown    [place] Hives    White Petrolatum-Mineral Oil Itching and Swelling    Bacitracin Rash    Latex Rash    Nickel Rash    Penicillins Rash    Pollen Extracts Rash     Cats, molds,dust mites, trees, grasses, weeds, ragweed       /74   Pulse 77   Temp 36.7 °C (98.1 °F)   Ht 1.613 m (5' 3.5\")   Wt 62.9 kg (138 lb 9.6 oz)   SpO2 99%   BMI 24.17 kg/m²     Physical Exam  Constitutional:       Appearance: Normal appearance.   HENT:      Head: Normocephalic and atraumatic.      Right Ear: There is impacted cerumen.      Left Ear: There is impacted cerumen.      Nose: Congestion present.      Mouth/Throat:      Pharynx: No oropharyngeal exudate or posterior oropharyngeal erythema.   Eyes:      Extraocular Movements: Extraocular movements intact.      Pupils: Pupils are equal, round, and reactive to light.   Neck:      Comments: No lymphadenopathy in head or neck.  Cardiovascular:      Rate and Rhythm: Normal rate and regular rhythm.   Pulmonary:      Breath sounds: Normal breath sounds.   Abdominal:      General: Abdomen is flat. Bowel sounds are normal.      Palpations: Abdomen is soft.   Musculoskeletal:      Right lower leg: No edema.      Left lower leg: No edema.   Neurological:      Mental Status: She is alert.           Assessment/Plan   Problem List Items Addressed This Visit  "      URTI (acute upper respiratory infection) - Primary     This may be an early sinusitis.  As her symptopms have worsened this week I will give her cephalexin 500mg BID X7 days.    Patient is to call back next week if she is not better.  She was warned that this may be viral , in which case it just needs to run its course.         Relevant Medications    cephalexin (Keflex) 500 mg capsule              It has been a pleasure seeing you.  Arlette Jewell MD

## 2025-03-11 ENCOUNTER — APPOINTMENT (OUTPATIENT)
Dept: PRIMARY CARE | Facility: CLINIC | Age: 50
End: 2025-03-11
Payer: COMMERCIAL

## 2025-03-11 VITALS
DIASTOLIC BLOOD PRESSURE: 72 MMHG | HEART RATE: 76 BPM | BODY MASS INDEX: 24.17 KG/M2 | OXYGEN SATURATION: 98 % | HEIGHT: 64 IN | WEIGHT: 141.6 LBS | SYSTOLIC BLOOD PRESSURE: 106 MMHG

## 2025-03-11 DIAGNOSIS — Z23 NEED FOR SHINGLES VACCINE: ICD-10-CM

## 2025-03-11 DIAGNOSIS — Z00.00 ANNUAL PHYSICAL EXAM: Primary | ICD-10-CM

## 2025-03-11 DIAGNOSIS — J45.20 MILD INTERMITTENT ASTHMA, UNSPECIFIED WHETHER COMPLICATED (HHS-HCC): ICD-10-CM

## 2025-03-11 DIAGNOSIS — J30.9 ALLERGIC RHINITIS, UNSPECIFIED SEASONALITY, UNSPECIFIED TRIGGER: ICD-10-CM

## 2025-03-11 DIAGNOSIS — E78.00 HYPERCHOLESTEROLEMIA: ICD-10-CM

## 2025-03-11 DIAGNOSIS — I10 PRIMARY HYPERTENSION: ICD-10-CM

## 2025-03-11 PROBLEM — Z12.11 COLON CANCER SCREENING: Status: RESOLVED | Noted: 2023-03-14 | Resolved: 2025-03-11

## 2025-03-11 PROBLEM — Z12.31 SCREENING MAMMOGRAM, ENCOUNTER FOR: Status: RESOLVED | Noted: 2023-07-11 | Resolved: 2025-03-11

## 2025-03-11 PROBLEM — R11.0 NAUSEA: Status: RESOLVED | Noted: 2024-11-12 | Resolved: 2025-03-11

## 2025-03-11 PROCEDURE — 3074F SYST BP LT 130 MM HG: CPT | Performed by: INTERNAL MEDICINE

## 2025-03-11 PROCEDURE — 3008F BODY MASS INDEX DOCD: CPT | Performed by: INTERNAL MEDICINE

## 2025-03-11 PROCEDURE — 90750 HZV VACC RECOMBINANT IM: CPT | Performed by: INTERNAL MEDICINE

## 2025-03-11 PROCEDURE — 1036F TOBACCO NON-USER: CPT | Performed by: INTERNAL MEDICINE

## 2025-03-11 PROCEDURE — 90471 IMMUNIZATION ADMIN: CPT | Performed by: INTERNAL MEDICINE

## 2025-03-11 PROCEDURE — 3078F DIAST BP <80 MM HG: CPT | Performed by: INTERNAL MEDICINE

## 2025-03-11 PROCEDURE — 99214 OFFICE O/P EST MOD 30 MIN: CPT | Performed by: INTERNAL MEDICINE

## 2025-03-11 RX ORDER — FLUTICASONE PROPIONATE 50 MCG
2 SPRAY, SUSPENSION (ML) NASAL DAILY
Qty: 16 G | Refills: 5 | Status: SHIPPED | OUTPATIENT
Start: 2025-03-11

## 2025-03-11 ASSESSMENT — ENCOUNTER SYMPTOMS
DYSURIA: 0
ABDOMINAL PAIN: 0
DIZZINESS: 0
FATIGUE: 0
ARTHRALGIAS: 0
FREQUENCY: 0
SHORTNESS OF BREATH: 0
COUGH: 0
SORE THROAT: 0
LIGHT-HEADEDNESS: 0
CONSTIPATION: 0
NAUSEA: 0
DIARRHEA: 0
TROUBLE SWALLOWING: 0
PALPITATIONS: 0
VOMITING: 0
FEVER: 0

## 2025-03-11 ASSESSMENT — PAIN SCALES - GENERAL: PAINLEVEL_OUTOF10: 0-NO PAIN

## 2025-03-11 NOTE — ASSESSMENT & PLAN NOTE
Patient manages her cholesterol through diet exercise and lifestyle modifications.  Her next lipid profile will be before her next appointment

## 2025-03-11 NOTE — ASSESSMENT & PLAN NOTE
Asthma is also stable.  She has not had to use her rescue inhaler at all in the last month.  It typically is worse in the spring and summer.

## 2025-03-11 NOTE — PROGRESS NOTES
Subjective   Patient ID: Rosalva Mayen is a 49 y.o. female who presents for 4 month for HTN, cholesterol and GERD management.    Patient is here for 4-month follow-up for cholesterol hypertension depression and asthma.  She feels well and has no current complaints.  The only refill she needs is for Flonase which she uses in the spring for allergies.            Review of Systems   Constitutional:  Negative for fatigue and fever.   HENT:  Negative for sore throat and trouble swallowing.    Eyes:  Negative for visual disturbance.   Respiratory:  Negative for cough and shortness of breath.    Cardiovascular:  Negative for chest pain, palpitations and leg swelling.   Gastrointestinal:  Negative for abdominal pain, constipation, diarrhea, nausea and vomiting.   Genitourinary:  Negative for dysuria and frequency.   Musculoskeletal:  Negative for arthralgias.   Skin:  Negative for rash.   Neurological:  Negative for dizziness and light-headedness.       Objective   Medication Documentation Review Audit       Reviewed by Arlette Jewell MD (Physician) on 03/11/25 at 0847      Medication Order Taking? Sig Documenting Provider Last Dose Status   cholecalciferol (Vitamin D-3) 125 MCG (5000 UT) capsule [id number] Yes Take 1 capsule (125 mcg) by mouth once daily. Historical Provider, MD Taking Active   clindamycin (Cleocin T) 1 % gel [id number]  Apply topically 2 times a day. apply to affected area   Patient not taking: Reported on 3/11/2025    Arlette Jewell MD  Active   fluticasone (Flonase) 50 mcg/actuation nasal spray [id number] Yes Administer 2 sprays into each nostril once daily. Historical Provider, MD Taking Active   ibuprofen 200 mg tablet [id number] Yes Take 1 tablet (200 mg) by mouth every 6 hours if needed for moderate pain (4 - 6). Historical Provider, MD  Active   lisinopril 20 mg tablet [id number] Yes Take 1 tablet (20 mg) by mouth once daily. Arlette Jewell MD  Active   norethindrone-e.estradioL-iron (Loestrin 24 FE) 1  "mg-20 mcg (24)/75 mg (4) tablet [id number]  Take 1 tablet by mouth once daily.   Patient not taking: Reported on 3/11/2025    Historical Provider, MD  Active   triamcinolone (Kenalog) 0.1 % cream [id number] Yes Apply topically 2 times a day. APPLY A THIN LAYER TO AFFECTED AREA(S) TWICE DAILY. Arlette Jewell MD  Active                  Allergies   Allergen Reactions    Nut - Unspecified Anaphylaxis    Tree Nuts Anaphylaxis    Clarithromycin Nausea Only    Codeine Unknown    Erythromycin Nausea Only and Other     Vomiting      Fruit Flavor Angioedema     [place]      Other Unknown    [place] Hives    White Petrolatum-Mineral Oil Itching and Swelling    Bacitracin Rash    Latex Rash    Nickel Rash    Penicillins Rash    Pollen Extracts Rash     Cats, molds,dust mites, trees, grasses, weeds, ragweed       /72   Pulse 76   Ht 1.613 m (5' 3.5\")   Wt 64.2 kg (141 lb 9.6 oz)   SpO2 98%   BMI 24.69 kg/m²     Physical Exam  Constitutional:       Appearance: Normal appearance.   HENT:      Head: Normocephalic and atraumatic.      Nose: Nose normal.   Eyes:      Extraocular Movements: Extraocular movements intact.      Pupils: Pupils are equal, round, and reactive to light.   Cardiovascular:      Rate and Rhythm: Normal rate and regular rhythm.   Pulmonary:      Breath sounds: Normal breath sounds.   Abdominal:      General: Abdomen is flat. Bowel sounds are normal.      Palpations: Abdomen is soft.   Musculoskeletal:      Right lower leg: No edema.      Left lower leg: No edema.   Neurological:      Mental Status: She is alert.           Assessment/Plan   Problem List Items Addressed This Visit       Allergic rhinitis    Relevant Medications    fluticasone (Flonase) 50 mcg/actuation nasal spray    Asthma     Asthma is also stable.  She has not had to use her rescue inhaler at all in the last month.  It typically is worse in the spring and summer.         Hypercholesterolemia     Patient manages her cholesterol through " diet exercise and lifestyle modifications.  Her next lipid profile will be before her next appointment         Hypertension     Blood pressure stable and well-controlled.  She did not need refills today.          Other Visit Diagnoses       Annual physical exam    -  Primary    Relevant Orders    Lipid Panel    CBC    Comprehensive Metabolic Panel    TSH with reflex to Free T4 if abnormal    Vitamin D 25-Hydroxy,Total (for eval of Vitamin D levels)    Need for shingles vaccine        Relevant Orders    Zoster vaccine, recombinant, adult (SHINGRIX) (Completed)                   It has been a pleasure seeing you.  Arlette Jewell MD

## 2025-03-11 NOTE — PATIENT INSTRUCTIONS
Follow up Dr Jewell in 4 months for HTN etc and second Shingrix      Get fasting labs before next appointment

## 2025-05-06 ENCOUNTER — APPOINTMENT (OUTPATIENT)
Dept: ORTHOPEDIC SURGERY | Facility: CLINIC | Age: 50
End: 2025-05-06
Payer: COMMERCIAL

## 2025-05-06 DIAGNOSIS — M25.561 RIGHT KNEE PAIN, UNSPECIFIED CHRONICITY: Primary | ICD-10-CM

## 2025-05-06 PROCEDURE — 99213 OFFICE O/P EST LOW 20 MIN: CPT | Performed by: ORTHOPAEDIC SURGERY

## 2025-05-06 PROCEDURE — 20610 DRAIN/INJ JOINT/BURSA W/O US: CPT | Performed by: ORTHOPAEDIC SURGERY

## 2025-05-06 RX ORDER — METHYLPREDNISOLONE ACETATE 40 MG/ML
40 INJECTION, SUSPENSION INTRA-ARTICULAR; INTRALESIONAL; INTRAMUSCULAR; SOFT TISSUE
Status: COMPLETED | OUTPATIENT
Start: 2025-05-06 | End: 2025-05-06

## 2025-05-06 RX ORDER — LIDOCAINE HYDROCHLORIDE 10 MG/ML
4 INJECTION, SOLUTION INFILTRATION; PERINEURAL
Status: COMPLETED | OUTPATIENT
Start: 2025-05-06 | End: 2025-05-06

## 2025-05-06 RX ADMIN — LIDOCAINE HYDROCHLORIDE 4 ML: 10 INJECTION, SOLUTION INFILTRATION; PERINEURAL at 08:23

## 2025-05-06 RX ADMIN — METHYLPREDNISOLONE ACETATE 40 MG: 40 INJECTION, SUSPENSION INTRA-ARTICULAR; INTRALESIONAL; INTRAMUSCULAR; SOFT TISSUE at 08:23

## 2025-05-06 NOTE — PROGRESS NOTES
Patient returns to see me today she has known osteoarthritis of her knee her last injection was a year ago she like to repeated I think that is reasonable past medical history is unchanged as we last saw her.    Their limb is warm, and well-perfused.  They have intact sensation to light touch in all lower extremity dermatomes.  Their quadriceps and hamstring strength is 5 of 5.  Their skin is intact.  Their limb is stable from a ligament standpoint  There is a trace effusion.  There is one out of 3 patellofemoral crepitus    Range of motion is preserved    Patient ID: Rosalva Mayen is a 49 y.o. female.    L Inj/Asp: R knee on 5/6/2025 8:23 AM  Indications: pain  Details: 22 G needle, anterior approach  Medications: 40 mg methylPREDNISolone acetate 40 mg/mL; 4 mL lidocaine 10 mg/mL (1 %)    Under sterile conditions the right knee was injected with 4cc of lidocaine 40mg DepoMedrol.  The patient tolerated the procedure well.  There were no apparent complications.  Sterile bandage was applied.  Procedure, treatment alternatives, risks and benefits explained, specific risks discussed. Consent was given by the patient. Immediately prior to procedure a time out was called to verify the correct patient, procedure, equipment, support staff and site/side marked as required. Patient was prepped and draped in the usual sterile fashion.       Successful injection for osteoarthritis in the knee.  Follow-up as needed

## 2025-05-28 ENCOUNTER — TELEPHONE (OUTPATIENT)
Dept: PRIMARY CARE | Facility: CLINIC | Age: 50
End: 2025-05-28
Payer: COMMERCIAL

## 2025-05-28 NOTE — TELEPHONE ENCOUNTER
Patient is experiencing dizziness and nausea for the last couple of weeks. Her BP today was 92/65  Is the 15 minute opening on Monday long enough?    Rosalva

## 2025-06-02 ENCOUNTER — APPOINTMENT (OUTPATIENT)
Dept: PRIMARY CARE | Facility: CLINIC | Age: 50
End: 2025-06-02
Payer: COMMERCIAL

## 2025-06-02 VITALS
TEMPERATURE: 98.2 F | OXYGEN SATURATION: 99 % | HEIGHT: 64 IN | HEART RATE: 70 BPM | DIASTOLIC BLOOD PRESSURE: 66 MMHG | WEIGHT: 134 LBS | SYSTOLIC BLOOD PRESSURE: 98 MMHG | BODY MASS INDEX: 22.88 KG/M2

## 2025-06-02 DIAGNOSIS — R42 DIZZINESS: Primary | ICD-10-CM

## 2025-06-02 DIAGNOSIS — E16.2 HYPOGLYCEMIA: ICD-10-CM

## 2025-06-02 PROCEDURE — 1036F TOBACCO NON-USER: CPT | Performed by: INTERNAL MEDICINE

## 2025-06-02 PROCEDURE — 3008F BODY MASS INDEX DOCD: CPT | Performed by: INTERNAL MEDICINE

## 2025-06-02 PROCEDURE — 99213 OFFICE O/P EST LOW 20 MIN: CPT | Performed by: INTERNAL MEDICINE

## 2025-06-02 PROCEDURE — 3074F SYST BP LT 130 MM HG: CPT | Performed by: INTERNAL MEDICINE

## 2025-06-02 PROCEDURE — 3078F DIAST BP <80 MM HG: CPT | Performed by: INTERNAL MEDICINE

## 2025-06-02 ASSESSMENT — ENCOUNTER SYMPTOMS
DIAPHORESIS: 1
HEADACHES: 0
APPETITE CHANGE: 0
NECK STIFFNESS: 1
DIFFICULTY URINATING: 0
DIZZINESS: 1
WEAKNESS: 0
CONSTIPATION: 0
SHORTNESS OF BREATH: 0
NAUSEA: 0
ACTIVITY CHANGE: 0
PALPITATIONS: 0
LIGHT-HEADEDNESS: 1
SORE THROAT: 0
COUGH: 0

## 2025-06-02 ASSESSMENT — PATIENT HEALTH QUESTIONNAIRE - PHQ9
SUM OF ALL RESPONSES TO PHQ9 QUESTIONS 1 AND 2: 0
2. FEELING DOWN, DEPRESSED OR HOPELESS: NOT AT ALL
1. LITTLE INTEREST OR PLEASURE IN DOING THINGS: NOT AT ALL

## 2025-06-02 ASSESSMENT — PAIN SCALES - GENERAL: PAINLEVEL_OUTOF10: 4

## 2025-06-02 NOTE — ASSESSMENT & PLAN NOTE
Patient's orthostatic vital were negative. Laying down BP was 112/72 and pulse at 72. Sitting up BP was 106/68 with pulse at 76. Standing BP was 98/66 with pulse at 70.     Patient denies eating breakfast in the morning, she only eats 1 meal per day and drinks about 1 gallon of reversed osmosis water per day. Patient exercises frequently.     Patient states the episodes last around 30 seconds or less, she will sit, drink water and then it goes away, mostly in the mornings. At times she has felt like she is going to pass out with some diaphoresis, but other times she feels more lightheaded. At times it is related to positional changes.     Recommended to patient to drink a Gatorade/Pedialyte/Powerade daily for the electrolytes and sugar.     Patient instructed to call back if symptoms or not improving with these measures.

## 2025-06-02 NOTE — PATIENT INSTRUCTIONS
Please eat breakfast or drink chocolate milk.  Please drink a liquid with electrolytes such as Gatorade or Pedialyte every day.  If symptoms are not improving please contact Dr. Jewell.

## 2025-06-02 NOTE — ASSESSMENT & PLAN NOTE
Suspected diagnosis due to dizziness episodes more frequently in the morning and patient denies eating breakfast, as it makes her feel nauseous if she eats. Patient drinks 64 oz of water in the morning.    Recommended to patient to eat something small for breakfast like a spoon full of peanut butter, protein bar. Patient states she can start drinking a glass of chocolate milk in the morning especially if going biking.

## 2025-06-02 NOTE — PROGRESS NOTES
Subjective   Patient ID: Rosalva Mayen is a 49 y.o. female who presents for No chief complaint on file..  Patient is here for symptoms of dizziness that has been ongoing since April on and off. Patient reports this is mostly happening in the mornings, sometimes while riding her bike, but not always. Patient states in the morning when she gets up from sitting to standing she also sometimes experiences dizziness. The episodes last around 30 seconds.   Patient is having a hard time distinguishing becoming dizzy or the room spinning versus becoming lightheaded and feeling like she is going to pass out.  Patient denies tunnel vision during the episodes  Patient denies any changes in medication dosing, diet or fluid intake.     Patient had GI illness starting with nausea, vomiting and diarrhea that started last week and is on the tail end of the sickness.         Review of Systems   Constitutional:  Positive for diaphoresis. Negative for activity change and appetite change.        Diaphoresis at times of dizziness episodes.   HENT:  Positive for tinnitus. Negative for congestion and sore throat.    Eyes:  Negative for visual disturbance.   Respiratory:  Negative for cough and shortness of breath.    Cardiovascular:  Negative for chest pain and palpitations.   Gastrointestinal:  Negative for constipation and nausea.   Genitourinary:  Negative for difficulty urinating.   Musculoskeletal:  Positive for neck stiffness.   Skin:  Negative for rash.   Neurological:  Positive for dizziness and light-headedness. Negative for syncope, weakness and headaches.       Objective   Medication Documentation Review Audit       Reviewed by Chante Ceja MA (Medical Assistant) on 06/02/25 at 1445      Medication Order Taking? Sig Documenting Provider Last Dose Status   cholecalciferol (Vitamin D-3) 125 MCG (5000 UT) capsule [id number] Yes Take 1 capsule (125 mcg) by mouth once daily. Historical Provider, MD Taking Active   fluticasone (Flonase)  "50 mcg/actuation nasal spray [id number] Yes Administer 2 sprays into each nostril once daily. Arlette Jewell MD  Active   ibuprofen 200 mg tablet [id number] Yes Take 1 tablet (200 mg) by mouth every 6 hours if needed for moderate pain (4 - 6). Historical Provider, MD  Active   lisinopril 20 mg tablet [id number] Yes Take 1 tablet (20 mg) by mouth once daily. Arlette Jewell MD  Active   norethindrone-e.estradioL-iron (Loestrin 24 FE) 1 mg-20 mcg (24)/75 mg (4) tablet [id number]  Take 1 tablet by mouth once daily.   Patient not taking: Reported on 3/11/2025    Historical Provider, MD  Active   triamcinolone (Kenalog) 0.1 % cream [id number] Yes Apply topically 2 times a day. APPLY A THIN LAYER TO AFFECTED AREA(S) TWICE DAILY. Arlette eJwell MD  Active                  Allergies[1]    BP 98/66 (Patient Position: Standing)   Pulse 70   Temp 36.8 °C (98.2 °F)   Ht 1.613 m (5' 3.5\")   Wt 60.8 kg (134 lb)   SpO2 99%   BMI 23.36 kg/m²     Physical Exam  Constitutional:       Appearance: Normal appearance.   HENT:      Head: Normocephalic and atraumatic.      Nose: Nose normal.   Cardiovascular:      Rate and Rhythm: Normal rate and regular rhythm.      Pulses: Normal pulses.      Heart sounds: Normal heart sounds.      Comments: Orthostatic vitals are negative.  Pulmonary:      Effort: Pulmonary effort is normal.      Breath sounds: Normal breath sounds.   Abdominal:      General: Abdomen is flat.      Palpations: Abdomen is soft.   Musculoskeletal:      Right lower leg: No edema.      Left lower leg: No edema.   Neurological:      Mental Status: She is alert.           Assessment/Plan   Problem List Items Addressed This Visit       Dizziness - Primary    Patient's orthostatic vital were negative. Laying down BP was 112/72 and pulse at 72. Sitting up BP was 106/68 with pulse at 76. Standing BP was 98/66 with pulse at 70.     Patient denies eating breakfast in the morning, she only eats 1 meal per day and drinks about 1 " gallon of reversed osmosis water per day. Patient exercises frequently.     Patient states the episodes last around 30 seconds or less, she will sit, drink water and then it goes away, mostly in the mornings. At times she has felt like she is going to pass out with some diaphoresis, but other times she feels more lightheaded. At times it is related to positional changes.     Recommended to patient to drink a Gatorade/Pedialyte/Powerade daily for the electrolytes and sugar.     Patient instructed to call back if symptoms or not improving with these measures.         Hypoglycemia    Suspected diagnosis due to dizziness episodes more frequently in the morning and patient denies eating breakfast, as it makes her feel nauseous if she eats. Patient drinks 64 oz of water in the morning.    Recommended to patient to eat something small for breakfast like a spoon full of peanut butter, protein bar. Patient states she can start drinking a glass of chocolate milk in the morning especially if going biking.               This note or encounter for this patient visit included a student.  I have independently interviewed the patient, performed a physical exam and performed my own assessment and plan and orders.           It has been a pleasure seeing you.  Arlette Jewell MD         [1]   Allergies  Allergen Reactions    Nut - Unspecified Anaphylaxis    Tree Nuts Anaphylaxis    Clarithromycin Nausea Only    Codeine Unknown    Erythromycin Nausea Only and Other     Vomiting      Fruit Flavor Angioedema     [place]      Other Unknown    [place] Hives    White Petrolatum-Mineral Oil Itching and Swelling    Bacitracin Rash    Latex Rash    Nickel Rash    Penicillins Rash    Pollen Extracts Rash     Cats, molds,dust mites, trees, grasses, weeds, ragweed

## 2025-06-04 ENCOUNTER — TELEPHONE (OUTPATIENT)
Dept: PRIMARY CARE | Facility: CLINIC | Age: 50
End: 2025-06-04
Payer: COMMERCIAL

## 2025-06-04 NOTE — TELEPHONE ENCOUNTER
Rosalva called and states that her flu symptoms are worse since her office visit 2 days ago and she would like to be seen. She has pain in her lower left abdomen, can barely walk, diarrhea for 7 days, feels feverish.       Rosalva Mayen  [phone]

## 2025-06-09 ENCOUNTER — PATIENT OUTREACH (OUTPATIENT)
Dept: PRIMARY CARE | Facility: CLINIC | Age: 50
End: 2025-06-09
Payer: COMMERCIAL

## 2025-06-09 NOTE — PROGRESS NOTES
Discharge Facility: [facility]  Discharge Diagnosis: Dehydration   Colitis   LLQ pain   Diarrhea, unspecified type   Nausea and vomiting, unspecified vomiting type   Admission Date: 6/4/25  Discharge Date: 6/6/25    PCP Appointment Date: 7/15/25  Specialist Appointment Date: TBD  Hospital Encounter and Summary Linked: Yes  Hospital Encounter      See discharge assessment below for further details     Wrap Up  Wrap Up Additional Comments: This CM spoke with patient via phone. Successful transition of care outreach complete. Pt reports doing well at home since discharge. New meds reviewed. Pt denies any prescription medication questions.  Patient denies any further discharge questions/needs at this time. Emphasized that Follow up is needed after discharge to review the hospital recommendations, assess your response to your treatment.  Pt aware of my availability for non-emergent concerns. Contact info provided to patient. (6/9/2025 10:30 AM)    Medications  Medications reviewed with patient/caregiver?: Yes (6/9/2025 10:30 AM)  Is the patient having any side effects they believe may be caused by any medication additions or changes?: No (6/9/2025 10:30 AM)  Does the patient have all medications ordered at discharge?: Yes (6/9/2025 10:30 AM)  Care Management Interventions: No intervention needed (6/9/2025 10:30 AM)  Prescription Comments: ciprofloxacin 500 MG tablet Commonly known as: Cipro Take 1 tablet (500 mg) by mouth 2 times daily for 5 days.  metroNIDAZOLE 500 MG tablet Commonly known as: Flagyl Take 1 tablet (500 mg) by mouth 3 times daily for 5 days.  ondansetron 4 MG tablet Commonly known as: Zofran Take 1 tablet (4 mg) by mouth every 8 hours as needed for nausea or vomiting for up to 4 days. (6/9/2025 10:30 AM)  Is the patient taking all medications as directed (includes completed medication regime)?: Yes (6/9/2025 10:30 AM)  Care Management Interventions: Provided patient education (6/9/2025  10:30 AM)  Medication Comments: no noted issues obtaining medications (6/9/2025 10:30 AM)    Appointments  Does the patient have a primary care provider?: Yes (6/9/2025 10:30 AM)  Care Management Interventions: Educated patient on importance of making appointment; Advised patient to make appointment (6/9/2025 10:30 AM)  Has the patient kept scheduled appointments due by today?: Yes (6/9/2025 10:30 AM)  Care Management Interventions: Advised patient to keep appointment (6/9/2025 10:30 AM)    Self Management  What is the home health agency?: denies need (6/9/2025 10:30 AM)  What Durable Medical Equipment (DME) was ordered?: denies need (6/9/2025 10:30 AM)    Patient Teaching  Does the patient have access to their discharge instructions?: Yes (6/9/2025 10:30 AM)  Care Management Interventions: Reviewed instructions with patient (6/9/2025 10:30 AM)  What is the patient's perception of their health status since discharge?: Improving (6/9/2025 10:30 AM)  Is the patient/caregiver able to teach back the hierarchy of who to call/visit for symptoms/problems? PCP, Specialist, Home Health nurse, Urgent Care, ED, 911: Yes (6/9/2025 10:30 AM)

## 2025-06-25 ENCOUNTER — PATIENT OUTREACH (OUTPATIENT)
Dept: PRIMARY CARE | Facility: CLINIC | Age: 50
End: 2025-06-25
Payer: COMMERCIAL

## 2025-07-08 LAB
25(OH)D3+25(OH)D2 SERPL-MCNC: 70 NG/ML (ref 30–100)
ALBUMIN SERPL-MCNC: 4.5 G/DL (ref 3.6–5.1)
ALP SERPL-CCNC: 88 U/L (ref 37–153)
ALT SERPL-CCNC: 8 U/L (ref 6–29)
ANION GAP SERPL CALCULATED.4IONS-SCNC: 10 MMOL/L (CALC) (ref 7–17)
AST SERPL-CCNC: 14 U/L (ref 10–35)
BILIRUB SERPL-MCNC: 0.5 MG/DL (ref 0.2–1.2)
BUN SERPL-MCNC: 12 MG/DL (ref 7–25)
CALCIUM SERPL-MCNC: 9.8 MG/DL (ref 8.6–10.4)
CHLORIDE SERPL-SCNC: 101 MMOL/L (ref 98–110)
CHOLEST SERPL-MCNC: 270 MG/DL
CHOLEST/HDLC SERPL: 2.9 (CALC)
CO2 SERPL-SCNC: 24 MMOL/L (ref 20–32)
CREAT SERPL-MCNC: 0.76 MG/DL (ref 0.5–1.03)
EGFRCR SERPLBLD CKD-EPI 2021: 95 ML/MIN/1.73M2
ERYTHROCYTE [DISTWIDTH] IN BLOOD BY AUTOMATED COUNT: 13.6 % (ref 11–15)
GLUCOSE SERPL-MCNC: 87 MG/DL (ref 65–99)
HCT VFR BLD AUTO: 42.6 % (ref 35–45)
HDLC SERPL-MCNC: 92 MG/DL
HGB BLD-MCNC: 13.4 G/DL (ref 11.7–15.5)
LDLC SERPL CALC-MCNC: 162 MG/DL (CALC)
MCH RBC QN AUTO: 28.6 PG (ref 27–33)
MCHC RBC AUTO-ENTMCNC: 31.5 G/DL (ref 32–36)
MCV RBC AUTO: 90.8 FL (ref 80–100)
NONHDLC SERPL-MCNC: 178 MG/DL (CALC)
PLATELET # BLD AUTO: 338 THOUSAND/UL (ref 140–400)
PMV BLD REES-ECKER: 10.6 FL (ref 7.5–12.5)
POTASSIUM SERPL-SCNC: 4.4 MMOL/L (ref 3.5–5.3)
PROT SERPL-MCNC: 7.4 G/DL (ref 6.1–8.1)
RBC # BLD AUTO: 4.69 MILLION/UL (ref 3.8–5.1)
SODIUM SERPL-SCNC: 135 MMOL/L (ref 135–146)
TRIGL SERPL-MCNC: 63 MG/DL
TSH SERPL-ACNC: 2.64 MIU/L
WBC # BLD AUTO: 5.2 THOUSAND/UL (ref 3.8–10.8)

## 2025-07-15 ENCOUNTER — APPOINTMENT (OUTPATIENT)
Dept: PRIMARY CARE | Facility: CLINIC | Age: 50
End: 2025-07-15
Payer: COMMERCIAL

## 2025-07-15 VITALS
BODY MASS INDEX: 22.74 KG/M2 | HEART RATE: 73 BPM | WEIGHT: 133.2 LBS | DIASTOLIC BLOOD PRESSURE: 71 MMHG | OXYGEN SATURATION: 98 % | HEIGHT: 64 IN | SYSTOLIC BLOOD PRESSURE: 102 MMHG

## 2025-07-15 DIAGNOSIS — E78.00 HYPERCHOLESTEROLEMIA: ICD-10-CM

## 2025-07-15 DIAGNOSIS — I10 PRIMARY HYPERTENSION: ICD-10-CM

## 2025-07-15 DIAGNOSIS — Z23 NEED FOR VACCINATION FOR ZOSTER: ICD-10-CM

## 2025-07-15 DIAGNOSIS — E78.5 HYPERLIPIDEMIA, UNSPECIFIED HYPERLIPIDEMIA TYPE: Primary | ICD-10-CM

## 2025-07-15 DIAGNOSIS — J45.20 MILD INTERMITTENT ASTHMA, UNSPECIFIED WHETHER COMPLICATED (HHS-HCC): ICD-10-CM

## 2025-07-15 PROCEDURE — 90471 IMMUNIZATION ADMIN: CPT | Performed by: INTERNAL MEDICINE

## 2025-07-15 PROCEDURE — 3074F SYST BP LT 130 MM HG: CPT | Performed by: INTERNAL MEDICINE

## 2025-07-15 PROCEDURE — 3008F BODY MASS INDEX DOCD: CPT | Performed by: INTERNAL MEDICINE

## 2025-07-15 PROCEDURE — 3078F DIAST BP <80 MM HG: CPT | Performed by: INTERNAL MEDICINE

## 2025-07-15 PROCEDURE — 90750 HZV VACC RECOMBINANT IM: CPT | Performed by: INTERNAL MEDICINE

## 2025-07-15 PROCEDURE — 99214 OFFICE O/P EST MOD 30 MIN: CPT | Performed by: INTERNAL MEDICINE

## 2025-07-15 PROCEDURE — 1036F TOBACCO NON-USER: CPT | Performed by: INTERNAL MEDICINE

## 2025-07-15 ASSESSMENT — ENCOUNTER SYMPTOMS
COUGH: 0
LIGHT-HEADEDNESS: 0
SORE THROAT: 0
NAUSEA: 0
DIZZINESS: 0
CONSTIPATION: 0
DIARRHEA: 0
FEVER: 0
VOMITING: 0
TROUBLE SWALLOWING: 0
SHORTNESS OF BREATH: 0
ABDOMINAL PAIN: 0
FATIGUE: 0
PALPITATIONS: 0
FREQUENCY: 0
DYSURIA: 0
ARTHRALGIAS: 0

## 2025-07-15 ASSESSMENT — PAIN SCALES - GENERAL: PAINLEVEL_OUTOF10: 2

## 2025-07-15 NOTE — ASSESSMENT & PLAN NOTE
Patient's LDL cholesterol despite low-fat diet and vigorous exercise remains elevated.  Patient is adopted and is unaware of her family history.  At this time we are going to check a CT cardiac calcium score to see if we need to be more aggressive with her cholesterol management

## 2025-07-15 NOTE — PROGRESS NOTES
Subjective   Patient ID: Rosalva Mayen is a 49 y.o. female who presents for 4 month follow up for cholesterol and HTN management.    Patient is here for routine 4-month follow-up for her hypertension cholesterol and medication management.  We reviewed her labs again and her LDL remains quite high despite vigorous exercise and good dietary measures.  Patient is adopted and is unaware of her family history.          Review of Systems   Constitutional:  Negative for fatigue and fever.   HENT:  Negative for sore throat and trouble swallowing.    Eyes:  Negative for visual disturbance.   Respiratory:  Negative for cough and shortness of breath.    Cardiovascular:  Negative for chest pain, palpitations and leg swelling.   Gastrointestinal:  Negative for abdominal pain, constipation, diarrhea, nausea and vomiting.   Genitourinary:  Negative for dysuria and frequency.   Musculoskeletal:  Negative for arthralgias.   Skin:  Negative for rash.   Neurological:  Negative for dizziness and light-headedness.       Objective   Medication Documentation Review Audit       Reviewed by Arlette Jewell MD (Physician) on 07/15/25 at 0833      Medication Order Taking? Sig Documenting Provider Last Dose Status   cholecalciferol (Vitamin D-3) 125 MCG (5000 UT) capsule [id number] No Take 1 capsule (125 mcg) by mouth once daily. Historical Provider, MD Taking Active   fluticasone (Flonase) 50 mcg/actuation nasal spray [id number]  Administer 2 sprays into each nostril once daily. Arlette Jewell MD  Active   ibuprofen 200 mg tablet [id number]  Take 1 tablet (200 mg) by mouth every 6 hours if needed for moderate pain (4 - 6). Historical Provider, MD  Active   lisinopril 20 mg tablet [id number]  Take 1 tablet (20 mg) by mouth once daily. Arlette Jewell MD  Active   triamcinolone (Kenalog) 0.1 % cream [id number]  Apply topically 2 times a day. APPLY A THIN LAYER TO AFFECTED AREA(S) TWICE DAILY. Arlette Jewell MD  Active               "    Allergies[1]    /71   Pulse 73   Ht 1.613 m (5' 3.5\")   Wt 60.4 kg (133 lb 3.2 oz)   SpO2 98%   BMI 23.23 kg/m²     Physical Exam  Constitutional:       Appearance: Normal appearance.   HENT:      Head: Normocephalic and atraumatic.      Nose: Nose normal.   Eyes:      Extraocular Movements: Extraocular movements intact.      Pupils: Pupils are equal, round, and reactive to light.   Cardiovascular:      Rate and Rhythm: Normal rate and regular rhythm.   Pulmonary:      Breath sounds: Normal breath sounds.   Abdominal:      General: Abdomen is flat. Bowel sounds are normal.      Palpations: Abdomen is soft.   Musculoskeletal:      Right lower leg: No edema.      Left lower leg: No edema.   Neurological:      Mental Status: She is alert.           Assessment/Plan   Problem List Items Addressed This Visit       Asthma    Patient's asthma remained stable with no recent exacerbations         Hypercholesterolemia    Patient's LDL cholesterol despite low-fat diet and vigorous exercise remains elevated.  Patient is adopted and is unaware of her family history.  At this time we are going to check a CT cardiac calcium score to see if we need to be more aggressive with her cholesterol management         Relevant Orders    CT cardiac scoring wo IV contrast    Hypertension    Blood pressure stable and well-controlled no changes will be made today.          Other Visit Diagnoses         Hyperlipidemia, unspecified hyperlipidemia type    -  Primary    Relevant Orders    CT cardiac scoring wo IV contrast      Need for vaccination for zoster        Relevant Orders    Zoster vaccine, recombinant, adult (SHINGRIX) (Completed)                   It has been a pleasure seeing you.  Arlette Jewell MD         [1]   Allergies  Allergen Reactions    Nut - Unspecified Anaphylaxis    Tree Nuts Anaphylaxis    Clarithromycin Nausea Only    Codeine Unknown    Erythromycin Nausea Only and Other     Vomiting      Fruit Flavor " Angioedema     [place]      Other Unknown    [place] Hives    White Petrolatum-Mineral Oil Itching and Swelling    Bacitracin Rash    Latex Rash    Nickel Rash    Penicillins Rash    Pollen Extracts Rash     Cats, molds,dust mites, trees, grasses, weeds, ragweed

## 2025-10-16 ENCOUNTER — APPOINTMENT (OUTPATIENT)
Dept: RADIOLOGY | Facility: CLINIC | Age: 50
End: 2025-10-16
Payer: COMMERCIAL

## 2025-11-11 ENCOUNTER — APPOINTMENT (OUTPATIENT)
Dept: PRIMARY CARE | Facility: CLINIC | Age: 50
End: 2025-11-11
Payer: COMMERCIAL